# Patient Record
Sex: MALE | Race: WHITE | NOT HISPANIC OR LATINO | Employment: OTHER | URBAN - METROPOLITAN AREA
[De-identification: names, ages, dates, MRNs, and addresses within clinical notes are randomized per-mention and may not be internally consistent; named-entity substitution may affect disease eponyms.]

---

## 2017-04-17 ENCOUNTER — OFFICE VISIT (OUTPATIENT)
Dept: FAMILY MEDICINE CLINIC | Facility: CLINIC | Age: 57
End: 2017-04-17

## 2017-04-17 VITALS
OXYGEN SATURATION: 96 % | BODY MASS INDEX: 37.07 KG/M2 | HEART RATE: 60 BPM | WEIGHT: 281 LBS | TEMPERATURE: 97.7 F | SYSTOLIC BLOOD PRESSURE: 130 MMHG | DIASTOLIC BLOOD PRESSURE: 70 MMHG

## 2017-04-17 DIAGNOSIS — E78.49 OTHER HYPERLIPIDEMIA: ICD-10-CM

## 2017-04-17 DIAGNOSIS — I10 BENIGN ESSENTIAL HYPERTENSION: ICD-10-CM

## 2017-04-17 DIAGNOSIS — K50.00 CROHN'S DISEASE OF SMALL INTESTINE WITHOUT COMPLICATION (HCC): ICD-10-CM

## 2017-04-17 DIAGNOSIS — E11.9 TYPE 2 DIABETES MELLITUS WITHOUT COMPLICATION, WITHOUT LONG-TERM CURRENT USE OF INSULIN (HCC): Primary | ICD-10-CM

## 2017-04-17 DIAGNOSIS — E78.5 DYSLIPIDEMIA: ICD-10-CM

## 2017-04-17 DIAGNOSIS — E79.0 HYPERURICEMIA: ICD-10-CM

## 2017-04-17 PROCEDURE — 99214 OFFICE O/P EST MOD 30 MIN: CPT | Performed by: FAMILY MEDICINE

## 2017-04-17 RX ORDER — ONDANSETRON 4 MG/1
4 TABLET, FILM COATED ORAL EVERY 6 HOURS PRN
Qty: 30 TABLET | Refills: 1 | Status: SHIPPED | OUTPATIENT
Start: 2017-04-17 | End: 2018-05-02 | Stop reason: SDUPTHER

## 2017-04-17 RX ORDER — DICYCLOMINE HYDROCHLORIDE 10 MG/1
10 CAPSULE ORAL
Qty: 120 CAPSULE | Refills: 5 | Status: SHIPPED | OUTPATIENT
Start: 2017-04-17 | End: 2017-11-16 | Stop reason: SDUPTHER

## 2017-04-17 NOTE — PROGRESS NOTES
Subjective   Ramiro Tovar JR is a 57 y.o. male. Presents today for   Chief Complaint   Patient presents with   • Follow-up     med check refill done .  In the mornings has floaters in eyes.  Getting worse and has flashes of light in peripheal vision.     • Hyperlipidemia   • Hypertension   • Diabetes       Diabetes   He presents for his follow-up diabetic visit. He has type 2 diabetes mellitus. His disease course has been stable. Pertinent negatives for hypoglycemia include no dizziness, headaches, speech difficulty or tremors. Associated symptoms include visual change (Has up coming appt, directed to go see ophthalmology.). Pertinent negatives for diabetes include no blurred vision, no chest pain, no foot paresthesias, no foot ulcerations and no weakness. Symptoms are stable. Risk factors for coronary artery disease include diabetes mellitus, dyslipidemia, hypertension and male sex. Current diabetic treatment includes diet and oral agent (dual therapy). He is compliant with treatment all of the time. An ACE inhibitor/angiotensin II receptor blocker is being taken. Eye exam is current.   Hypertension   This is a chronic problem. The current episode started more than 1 year ago. The problem is unchanged. The problem is controlled. Pertinent negatives include no blurred vision, chest pain, headaches, orthopnea, palpitations, peripheral edema, PND or shortness of breath. There are no associated agents to hypertension. Risk factors for coronary artery disease include male gender, diabetes mellitus and dyslipidemia. Past treatments include diuretics and ACE inhibitors. The current treatment provides significant improvement. There are no compliance problems.    Hyperlipidemia   This is a chronic problem. The current episode started more than 1 year ago. The problem is controlled. Recent lipid tests were reviewed and are normal. Exacerbating diseases include diabetes. Factors aggravating his hyperlipidemia include  thiazides. Pertinent negatives include no chest pain, focal sensory loss, focal weakness, leg pain, myalgias or shortness of breath. Current antihyperlipidemic treatment includes statins. The current treatment provides moderate improvement of lipids. There are no compliance problems.  Risk factors for coronary artery disease include diabetes mellitus, dyslipidemia, hypertension and male sex.   Hx of gout, no flares  Hx of crohns - stable, only 1 flare.    Review of Systems   Constitutional: Unexpected weight change: Weight gain or loss.   Eyes: Negative for blurred vision.   Respiratory: Negative for shortness of breath and wheezing.    Cardiovascular: Negative for chest pain, palpitations, orthopnea, leg swelling and PND.   Gastrointestinal: Negative for abdominal pain, nausea and vomiting.   Musculoskeletal: Negative for myalgias.   Neurological: Negative for dizziness, tremors, focal weakness, syncope, facial asymmetry, speech difficulty, weakness, light-headedness, numbness and headaches.       The following portions of the patient's history were reviewed and updated as appropriate: allergies, current medications, past medical history and problem list.    Patient Active Problem List   Diagnosis   • Hyperuricemia   • Benign essential hypertension   • Benign prostatic hypertrophy without urinary obstruction   • Chronic pain syndrome   • Crohn's disease of colon   • Dyslipidemia   • Gout   • Weakness of hand   • Knee pain   • Low back pain   • Peripheral neuropathy   • Type 2 diabetes mellitus without complication, without long-term current use of insulin   • Vitamin D deficiency   • Crohn's disease   • History of colectomy   • Hyperlipidemia   • Calculus of kidney   • Adult body mass index greater than 30   • Small bowel obstruction       Allergies   Allergen Reactions   • Sulfa Antibiotics Rash   • Hydromorphone      Affects his chron's disease doesn't want this drug   • Hydromorphone Hcl      Affects his chron's  disease doesn't want this drug. Makes him sick and does not take away pain   • Morphine And Related      Has problems with his chron's   • Penicillins        Current Outpatient Prescriptions on File Prior to Visit   Medication Sig Dispense Refill   • allopurinol (ZYLOPRIM) 300 MG tablet Take 1 tablet by mouth 2 (Two) Times a Day. 180 tablet 3   • aspirin 81 MG tablet Take  by mouth daily.     • atorvastatin (LIPITOR) 40 MG tablet Take 1 tablet by mouth Daily. 90 tablet 3   • Cholecalciferol 1000 UNITS tablet Take  by mouth daily.     • clotrimazole-betamethasone (LOTRISONE) 1-0.05 % cream Apply  topically 2 (Two) Times a Day. 60 g 5   • colchicine 0.6 MG tablet Take 0.6 mg by mouth.     • dutasteride (AVODART) 0.5 MG capsule TAKE ONE CAPSULE BY MOUTH ONCE DAILY 90 capsule 3   • gabapentin (NEURONTIN) 600 MG tablet Take 1 tablet by mouth 3 (Three) Times a Day. 270 tablet 3   • HYDROcodone-acetaminophen (NORCO)  MG per tablet Take 1 tablet by mouth Every 6 (Six) Hours.     • lisinopril-hydrochlorothiazide (PRINZIDE,ZESTORETIC) 20-12.5 MG per tablet Take 1 tablet by mouth Daily. 90 tablet 3   • mesalamine (PENTASA) 500 MG CR capsule 2 daily 180 capsule 3   • Multiple Vitamin (MULTI VITAMIN DAILY PO) Take  by mouth daily.     • Omega-3 Fatty Acids (FISH OIL) 1000 MG capsule capsule Take 1 capsule by mouth.     • SITagliptin-MetFORMIN HCl -1000 MG tablet sustained-release 24 hour Take 1 tablet by mouth Daily. 90 tablet 3   • vitamin B-12 (CYANOCOBALAMIN) 1000 MCG tablet Take  by mouth daily.     • [DISCONTINUED] ondansetron (ZOFRAN) 4 MG tablet Take 1 tablet by mouth Every 6 (Six) Hours As Needed for nausea. 30 tablet 1     No current facility-administered medications on file prior to visit.        Objective   Vitals:    04/17/17 1030   BP: 130/70   Pulse: 60   Temp: 97.7 °F (36.5 °C)   SpO2: 96%   Weight: 281 lb (127 kg)       Physical Exam   Constitutional: He appears well-developed and well-nourished.    HENT:   Head: Normocephalic and atraumatic.   Neck: Neck supple. No JVD present. No thyromegaly present.   Cardiovascular: Normal rate, regular rhythm and normal heart sounds.  Exam reveals no gallop and no friction rub.    No murmur heard.  Pulmonary/Chest: Effort normal and breath sounds normal. No respiratory distress. He has no wheezes. He has no rales.   Abdominal: Soft. Bowel sounds are normal. He exhibits no distension. There is no tenderness. There is no rebound and no guarding.   Musculoskeletal: He exhibits no edema.   Neurological: He is alert.   Skin: Skin is warm and dry.   Psychiatric: He has a normal mood and affect. His behavior is normal.   Nursing note and vitals reviewed.      Assessment/Plan   Ramiro was seen today for follow-up, hyperlipidemia, hypertension and diabetes.    Diagnoses and all orders for this visit:    Type 2 diabetes mellitus without complication, without long-term current use of insulin  -     Comprehensive Metabolic Panel  -     Hemoglobin A1c  -     Lipid Panel  -     Uric Acid  -     Microalbumin / Creatinine Urine Ratio    Other hyperlipidemia  -     Comprehensive Metabolic Panel  -     Hemoglobin A1c  -     Lipid Panel  -     Uric Acid    Benign essential hypertension  -     Comprehensive Metabolic Panel  -     Hemoglobin A1c  -     Lipid Panel  -     Uric Acid    Hyperuricemia  -     Comprehensive Metabolic Panel  -     Hemoglobin A1c  -     Lipid Panel  -     Uric Acid    Dyslipidemia  -     Comprehensive Metabolic Panel  -     Hemoglobin A1c  -     Lipid Panel  -     Uric Acid    Crohn's disease of small intestine without complication  -     Comprehensive Metabolic Panel  -     Hemoglobin A1c  -     Lipid Panel  -     Uric Acid  -     dicyclomine (BENTYL) 10 MG capsule; Take 1 capsule by mouth 4 (Four) Times a Day Before Meals & at Bedtime.    Other orders  -     ondansetron (ZOFRAN) 4 MG tablet; Take 1 tablet by mouth Every 6 (Six) Hours As Needed for  Nausea.    -requests refills of bentyl as helps with occly cramping.  Hx of crohns, follows with GI  -hypertension - controlled, continue medications  -HLD - continue statin, recheck lipids.  -dm2 - check full labs as due       -Follow up: 6 months       Current Outpatient Prescriptions:   •  allopurinol (ZYLOPRIM) 300 MG tablet, Take 1 tablet by mouth 2 (Two) Times a Day., Disp: 180 tablet, Rfl: 3  •  aspirin 81 MG tablet, Take  by mouth daily., Disp: , Rfl:   •  atorvastatin (LIPITOR) 40 MG tablet, Take 1 tablet by mouth Daily., Disp: 90 tablet, Rfl: 3  •  Cholecalciferol 1000 UNITS tablet, Take  by mouth daily., Disp: , Rfl:   •  clotrimazole-betamethasone (LOTRISONE) 1-0.05 % cream, Apply  topically 2 (Two) Times a Day., Disp: 60 g, Rfl: 5  •  colchicine 0.6 MG tablet, Take 0.6 mg by mouth., Disp: , Rfl:   •  dutasteride (AVODART) 0.5 MG capsule, TAKE ONE CAPSULE BY MOUTH ONCE DAILY, Disp: 90 capsule, Rfl: 3  •  gabapentin (NEURONTIN) 600 MG tablet, Take 1 tablet by mouth 3 (Three) Times a Day., Disp: 270 tablet, Rfl: 3  •  HYDROcodone-acetaminophen (NORCO)  MG per tablet, Take 1 tablet by mouth Every 6 (Six) Hours., Disp: , Rfl:   •  lisinopril-hydrochlorothiazide (PRINZIDE,ZESTORETIC) 20-12.5 MG per tablet, Take 1 tablet by mouth Daily., Disp: 90 tablet, Rfl: 3  •  mesalamine (PENTASA) 500 MG CR capsule, 2 daily, Disp: 180 capsule, Rfl: 3  •  Multiple Vitamin (MULTI VITAMIN DAILY PO), Take  by mouth daily., Disp: , Rfl:   •  Omega-3 Fatty Acids (FISH OIL) 1000 MG capsule capsule, Take 1 capsule by mouth., Disp: , Rfl:   •  ondansetron (ZOFRAN) 4 MG tablet, Take 1 tablet by mouth Every 6 (Six) Hours As Needed for Nausea., Disp: 30 tablet, Rfl: 1  •  SITagliptin-MetFORMIN HCl -1000 MG tablet sustained-release 24 hour, Take 1 tablet by mouth Daily., Disp: 90 tablet, Rfl: 3  •  vitamin B-12 (CYANOCOBALAMIN) 1000 MCG tablet, Take  by mouth daily., Disp: , Rfl:

## 2017-04-18 LAB
ALBUMIN SERPL-MCNC: 4.3 G/DL (ref 3.5–5.5)
ALBUMIN/CREAT UR: 11.7 MG/G CREAT (ref 0–30)
ALBUMIN/GLOB SERPL: 2 {RATIO} (ref 1.2–2.2)
ALP SERPL-CCNC: 58 IU/L (ref 39–117)
ALT SERPL-CCNC: 24 IU/L (ref 0–44)
AST SERPL-CCNC: 23 IU/L (ref 0–40)
BILIRUB SERPL-MCNC: 0.4 MG/DL (ref 0–1.2)
BUN SERPL-MCNC: 16 MG/DL (ref 6–24)
BUN/CREAT SERPL: 18 (ref 9–20)
CALCIUM SERPL-MCNC: 9.5 MG/DL (ref 8.7–10.2)
CHLORIDE SERPL-SCNC: 103 MMOL/L (ref 96–106)
CHOLEST SERPL-MCNC: 104 MG/DL (ref 100–199)
CO2 SERPL-SCNC: 24 MMOL/L (ref 18–29)
CREAT SERPL-MCNC: 0.91 MG/DL (ref 0.76–1.27)
CREAT UR-MCNC: 137.8 MG/DL
GLOBULIN SER CALC-MCNC: 2.2 G/DL (ref 1.5–4.5)
GLUCOSE SERPL-MCNC: 120 MG/DL (ref 65–99)
HBA1C MFR BLD: 6.5 % (ref 4.8–5.6)
HDLC SERPL-MCNC: 27 MG/DL
LDLC SERPL CALC-MCNC: 50 MG/DL (ref 0–99)
MICROALBUMIN UR-MCNC: 16.1 UG/ML
POTASSIUM SERPL-SCNC: 4.6 MMOL/L (ref 3.5–5.2)
PROT SERPL-MCNC: 6.5 G/DL (ref 6–8.5)
SODIUM SERPL-SCNC: 142 MMOL/L (ref 134–144)
TRIGL SERPL-MCNC: 134 MG/DL (ref 0–149)
URATE SERPL-MCNC: 6 MG/DL (ref 3.7–8.6)
VLDLC SERPL CALC-MCNC: 27 MG/DL (ref 5–40)

## 2017-06-12 RX ORDER — LISINOPRIL AND HYDROCHLOROTHIAZIDE 20; 12.5 MG/1; MG/1
TABLET ORAL
Qty: 90 TABLET | Refills: 1 | Status: SHIPPED | OUTPATIENT
Start: 2017-06-12 | End: 2017-11-16 | Stop reason: SDUPTHER

## 2017-09-25 ENCOUNTER — OFFICE VISIT (OUTPATIENT)
Dept: FAMILY MEDICINE CLINIC | Facility: CLINIC | Age: 57
End: 2017-09-25

## 2017-09-25 VITALS
OXYGEN SATURATION: 96 % | DIASTOLIC BLOOD PRESSURE: 72 MMHG | BODY MASS INDEX: 38 KG/M2 | HEART RATE: 64 BPM | TEMPERATURE: 98.1 F | SYSTOLIC BLOOD PRESSURE: 138 MMHG | WEIGHT: 288 LBS

## 2017-09-25 DIAGNOSIS — Z23 IMMUNIZATION DUE: ICD-10-CM

## 2017-09-25 DIAGNOSIS — L72.3 INFECTED SEBACEOUS CYST: Primary | ICD-10-CM

## 2017-09-25 DIAGNOSIS — L08.9 INFECTED SEBACEOUS CYST: Primary | ICD-10-CM

## 2017-09-25 PROCEDURE — 90686 IIV4 VACC NO PRSV 0.5 ML IM: CPT | Performed by: FAMILY MEDICINE

## 2017-09-25 PROCEDURE — 90471 IMMUNIZATION ADMIN: CPT | Performed by: FAMILY MEDICINE

## 2017-09-25 PROCEDURE — 99213 OFFICE O/P EST LOW 20 MIN: CPT | Performed by: FAMILY MEDICINE

## 2017-09-25 RX ORDER — CLINDAMYCIN HYDROCHLORIDE 150 MG/1
150 CAPSULE ORAL 3 TIMES DAILY
Qty: 30 CAPSULE | Refills: 0 | Status: SHIPPED | OUTPATIENT
Start: 2017-09-25 | End: 2017-11-16

## 2017-09-25 NOTE — PROGRESS NOTES
Subjective   Ramiro Tovar JR is a 57 y.o. male. Presents today for   Chief Complaint   Patient presents with   • Follow-up     went to [facility] last week for cyst removal in back and needs checked. Flu vaccine       Cyst   This is a new problem. The current episode started in the past 7 days. The problem occurs constantly. The problem has been gradually improving. Pertinent negatives include no chills or fever. Associated symptoms comments: Has sebaceous cysts 1 neck and 1 mid-back;  1 mid-back became red, enlarged and painful.  Went to IC with I&D done.  Placed on doxy, but not tolerating as severe nausea.. Nothing aggravates the symptoms. Treatments tried: I&D, doxy. The treatment provided mild relief.     Reports ED, would like try viagra.    Due for flu  Review of Systems   Constitutional: Negative for chills and fever.   Skin: Positive for wound.       The following portions of the patient's history were reviewed and updated as appropriate: allergies, current medications, past medical history, past surgical history and problem list.    Patient Active Problem List   Diagnosis   • Hyperuricemia   • Benign essential hypertension   • Benign prostatic hypertrophy without urinary obstruction   • Chronic pain syndrome   • Crohn's disease of colon   • Dyslipidemia   • Gout   • Weakness of hand   • Knee pain   • Low back pain   • Peripheral neuropathy   • Type 2 diabetes mellitus without complication, without long-term current use of insulin   • Vitamin D deficiency   • Crohn's disease   • History of colectomy   • Hyperlipidemia   • Calculus of kidney   • Adult body mass index greater than 30   • Small bowel obstruction       Allergies   Allergen Reactions   • Sulfa Antibiotics Rash   • Hydromorphone      Affects his chron's disease doesn't want this drug   • Hydromorphone Hcl      Affects his chron's disease doesn't want this drug. Makes him sick and does not take away pain   • Morphine And Related      Has problems with  his chron's   • Penicillins        Current Outpatient Prescriptions on File Prior to Visit   Medication Sig Dispense Refill   • allopurinol (ZYLOPRIM) 300 MG tablet Take 1 tablet by mouth 2 (Two) Times a Day. 180 tablet 3   • aspirin 81 MG tablet Take  by mouth daily.     • atorvastatin (LIPITOR) 40 MG tablet Take 1 tablet by mouth Daily. 90 tablet 3   • Cholecalciferol 1000 UNITS tablet Take  by mouth daily.     • clotrimazole-betamethasone (LOTRISONE) 1-0.05 % cream Apply  topically 2 (Two) Times a Day. 60 g 5   • colchicine 0.6 MG tablet Take 0.6 mg by mouth.     • dicyclomine (BENTYL) 10 MG capsule Take 1 capsule by mouth 4 (Four) Times a Day Before Meals & at Bedtime. 120 capsule 5   • dutasteride (AVODART) 0.5 MG capsule TAKE ONE CAPSULE BY MOUTH ONCE DAILY 90 capsule 3   • gabapentin (NEURONTIN) 600 MG tablet Take 1 tablet by mouth 3 (Three) Times a Day. 270 tablet 3   • HYDROcodone-acetaminophen (NORCO)  MG per tablet Take 1 tablet by mouth Every 6 (Six) Hours.     • lisinopril-hydrochlorothiazide (PRINZIDE,ZESTORETIC) 20-12.5 MG per tablet Take 1 tablet by mouth Daily. 90 tablet 3   • lisinopril-hydrochlorothiazide (PRINZIDE,ZESTORETIC) 20-12.5 MG per tablet TAKE ONE TABLET BY MOUTH ONCE DAILY 90 tablet 1   • mesalamine (PENTASA) 500 MG CR capsule 2 daily 180 capsule 3   • Multiple Vitamin (MULTI VITAMIN DAILY PO) Take  by mouth daily.     • Omega-3 Fatty Acids (FISH OIL) 1000 MG capsule capsule Take 1 capsule by mouth.     • ondansetron (ZOFRAN) 4 MG tablet Take 1 tablet by mouth Every 6 (Six) Hours As Needed for Nausea. 30 tablet 1   • SITagliptin-MetFORMIN HCl -1000 MG tablet sustained-release 24 hour Take 1 tablet by mouth Daily. 90 tablet 3   • vitamin B-12 (CYANOCOBALAMIN) 1000 MCG tablet Take  by mouth daily.       No current facility-administered medications on file prior to visit.        Objective   Vitals:    09/25/17 0925   BP: 138/72   Pulse: 64   Temp: 98.1 °F (36.7 °C)   SpO2: 96%    Weight: 288 lb (131 kg)       Physical Exam   Constitutional: He appears well-developed and well-nourished.   Neck: Neck supple.   Neurological: He is alert.   Skin: Skin is warm and dry.        Psychiatric: He has a normal mood and affect. His behavior is normal.   Nursing note and vitals reviewed.      Assessment/Plan   Ramiro was seen today for follow-up.    Diagnoses and all orders for this visit:    Infected sebaceous cyst  -     mupirocin (BACTROBAN) 2 % ointment; Apply  topically 2 (Two) Times a Day.  -     clindamycin (CLEOCIN) 150 MG capsule; Take 1 capsule by mouth 3 (Three) Times a Day.    Immunization due  -     Flu Vaccine Quad PF 3YR+    -stop doxy;  Will change abx;  Scanty drainage, will keep packing out.  Keep covered while draining;  ONce heals, recommend removal;  Leaves for [place] after his November appt and would like wait until Spring and will discuss then  -gave viagra #3 50mg samples, use as directed;  D/w side effects and never use with nitro if ever takes.         -Follow up: 2 months       Current Outpatient Prescriptions:   •  allopurinol (ZYLOPRIM) 300 MG tablet, Take 1 tablet by mouth 2 (Two) Times a Day., Disp: 180 tablet, Rfl: 3  •  aspirin 81 MG tablet, Take  by mouth daily., Disp: , Rfl:   •  atorvastatin (LIPITOR) 40 MG tablet, Take 1 tablet by mouth Daily., Disp: 90 tablet, Rfl: 3  •  Cholecalciferol 1000 UNITS tablet, Take  by mouth daily., Disp: , Rfl:   •  clotrimazole-betamethasone (LOTRISONE) 1-0.05 % cream, Apply  topically 2 (Two) Times a Day., Disp: 60 g, Rfl: 5  •  colchicine 0.6 MG tablet, Take 0.6 mg by mouth., Disp: , Rfl:   •  dicyclomine (BENTYL) 10 MG capsule, Take 1 capsule by mouth 4 (Four) Times a Day Before Meals & at Bedtime., Disp: 120 capsule, Rfl: 5  •  dutasteride (AVODART) 0.5 MG capsule, TAKE ONE CAPSULE BY MOUTH ONCE DAILY, Disp: 90 capsule, Rfl: 3  •  gabapentin (NEURONTIN) 600 MG tablet, Take 1 tablet by mouth 3 (Three) Times a Day., Disp: 270  tablet, Rfl: 3  •  HYDROcodone-acetaminophen (NORCO)  MG per tablet, Take 1 tablet by mouth Every 6 (Six) Hours., Disp: , Rfl:   •  lisinopril-hydrochlorothiazide (PRINZIDE,ZESTORETIC) 20-12.5 MG per tablet, Take 1 tablet by mouth Daily., Disp: 90 tablet, Rfl: 3  •  lisinopril-hydrochlorothiazide (PRINZIDE,ZESTORETIC) 20-12.5 MG per tablet, TAKE ONE TABLET BY MOUTH ONCE DAILY, Disp: 90 tablet, Rfl: 1  •  mesalamine (PENTASA) 500 MG CR capsule, 2 daily, Disp: 180 capsule, Rfl: 3  •  Multiple Vitamin (MULTI VITAMIN DAILY PO), Take  by mouth daily., Disp: , Rfl:   •  Omega-3 Fatty Acids (FISH OIL) 1000 MG capsule capsule, Take 1 capsule by mouth., Disp: , Rfl:   •  ondansetron (ZOFRAN) 4 MG tablet, Take 1 tablet by mouth Every 6 (Six) Hours As Needed for Nausea., Disp: 30 tablet, Rfl: 1  •  SITagliptin-MetFORMIN HCl -1000 MG tablet sustained-release 24 hour, Take 1 tablet by mouth Daily., Disp: 90 tablet, Rfl: 3  •  vitamin B-12 (CYANOCOBALAMIN) 1000 MCG tablet, Take  by mouth daily., Disp: , Rfl:   •  clindamycin (CLEOCIN) 150 MG capsule, Take 1 capsule by mouth 3 (Three) Times a Day., Disp: 30 capsule, Rfl: 0  •  mupirocin (BACTROBAN) 2 % ointment, Apply  topically 2 (Two) Times a Day., Disp: 22 g, Rfl: 0

## 2017-11-16 ENCOUNTER — OFFICE VISIT (OUTPATIENT)
Dept: FAMILY MEDICINE CLINIC | Facility: CLINIC | Age: 57
End: 2017-11-16

## 2017-11-16 VITALS
BODY MASS INDEX: 38.26 KG/M2 | HEART RATE: 74 BPM | TEMPERATURE: 98.2 F | DIASTOLIC BLOOD PRESSURE: 80 MMHG | WEIGHT: 290 LBS | SYSTOLIC BLOOD PRESSURE: 132 MMHG | OXYGEN SATURATION: 95 %

## 2017-11-16 DIAGNOSIS — E79.0 HYPERURICEMIA: ICD-10-CM

## 2017-11-16 DIAGNOSIS — K50.00 CROHN'S DISEASE OF SMALL INTESTINE WITHOUT COMPLICATION (HCC): ICD-10-CM

## 2017-11-16 DIAGNOSIS — I10 BENIGN ESSENTIAL HYPERTENSION: ICD-10-CM

## 2017-11-16 DIAGNOSIS — E78.5 DYSLIPIDEMIA: ICD-10-CM

## 2017-11-16 DIAGNOSIS — E11.9 TYPE 2 DIABETES MELLITUS WITHOUT COMPLICATION, WITHOUT LONG-TERM CURRENT USE OF INSULIN (HCC): Primary | ICD-10-CM

## 2017-11-16 DIAGNOSIS — E55.9 VITAMIN D DEFICIENCY: ICD-10-CM

## 2017-11-16 LAB
25(OH)D3+25(OH)D2 SERPL-MCNC: 23.5 NG/ML (ref 30–100)
ALBUMIN SERPL-MCNC: 4.4 G/DL (ref 3.5–5.2)
ALBUMIN/GLOB SERPL: 1.6 G/DL
ALP SERPL-CCNC: 69 U/L (ref 39–117)
ALT SERPL-CCNC: 43 U/L (ref 1–41)
AST SERPL-CCNC: 35 U/L (ref 1–40)
BILIRUB SERPL-MCNC: 0.4 MG/DL (ref 0.1–1.2)
BUN SERPL-MCNC: 21 MG/DL (ref 6–20)
BUN/CREAT SERPL: 17.6 (ref 7–25)
CALCIUM SERPL-MCNC: 10.3 MG/DL (ref 8.6–10.5)
CHLORIDE SERPL-SCNC: 101 MMOL/L (ref 98–107)
CHOLEST SERPL-MCNC: 129 MG/DL (ref 0–200)
CO2 SERPL-SCNC: 25.4 MMOL/L (ref 22–29)
CREAT SERPL-MCNC: 1.19 MG/DL (ref 0.76–1.27)
GFR SERPLBLD CREATININE-BSD FMLA CKD-EPI: 63 ML/MIN/1.73
GFR SERPLBLD CREATININE-BSD FMLA CKD-EPI: 76 ML/MIN/1.73
GLOBULIN SER CALC-MCNC: 2.8 GM/DL
GLUCOSE SERPL-MCNC: 131 MG/DL (ref 65–99)
HBA1C MFR BLD: 6.36 % (ref 4.8–5.6)
HDLC SERPL-MCNC: 27 MG/DL (ref 40–60)
LDLC SERPL CALC-MCNC: 54 MG/DL (ref 0–100)
POTASSIUM SERPL-SCNC: 4.5 MMOL/L (ref 3.5–5.2)
PROT SERPL-MCNC: 7.2 G/DL (ref 6–8.5)
SODIUM SERPL-SCNC: 142 MMOL/L (ref 136–145)
TRIGL SERPL-MCNC: 238 MG/DL (ref 0–150)
URATE SERPL-MCNC: 5.9 MG/DL (ref 3.4–7)
VLDLC SERPL CALC-MCNC: 47.6 MG/DL (ref 5–40)

## 2017-11-16 PROCEDURE — 99214 OFFICE O/P EST MOD 30 MIN: CPT | Performed by: FAMILY MEDICINE

## 2017-11-16 RX ORDER — ALLOPURINOL 300 MG/1
300 TABLET ORAL 2 TIMES DAILY
Qty: 180 TABLET | Refills: 3 | Status: SHIPPED | OUTPATIENT
Start: 2017-11-16 | End: 2018-05-14 | Stop reason: SDUPTHER

## 2017-11-16 RX ORDER — COLCHICINE 0.6 MG/1
0.6 TABLET ORAL DAILY
Qty: 30 TABLET | Refills: 6 | Status: SHIPPED | OUTPATIENT
Start: 2017-11-16

## 2017-11-16 RX ORDER — LISINOPRIL AND HYDROCHLOROTHIAZIDE 20; 12.5 MG/1; MG/1
1 TABLET ORAL DAILY
Qty: 90 TABLET | Refills: 3 | Status: SHIPPED | OUTPATIENT
Start: 2017-11-16 | End: 2018-05-14 | Stop reason: SDUPTHER

## 2017-11-16 RX ORDER — DICYCLOMINE HYDROCHLORIDE 10 MG/1
10 CAPSULE ORAL
Qty: 120 CAPSULE | Refills: 5 | Status: SHIPPED | OUTPATIENT
Start: 2017-11-16 | End: 2018-05-14 | Stop reason: SDUPTHER

## 2017-11-16 RX ORDER — ATORVASTATIN CALCIUM 40 MG/1
40 TABLET, FILM COATED ORAL DAILY
Qty: 90 TABLET | Refills: 3 | Status: SHIPPED | OUTPATIENT
Start: 2017-11-16 | End: 2018-05-14 | Stop reason: SDUPTHER

## 2017-11-16 RX ORDER — MESALAMINE 500 MG/1
CAPSULE, EXTENDED RELEASE ORAL
Qty: 180 CAPSULE | Refills: 3 | Status: SHIPPED | OUTPATIENT
Start: 2017-11-16 | End: 2018-05-14 | Stop reason: SDUPTHER

## 2017-11-16 RX ORDER — DUTASTERIDE 0.5 MG/1
0.5 CAPSULE, LIQUID FILLED ORAL DAILY
Qty: 90 CAPSULE | Refills: 3 | Status: SHIPPED | OUTPATIENT
Start: 2017-11-16 | End: 2018-05-14 | Stop reason: SDUPTHER

## 2017-11-16 NOTE — PROGRESS NOTES
Subjective   Ramiro Tovar JR is a 57 y.o. male. Presents today for   Chief Complaint   Patient presents with   • Follow-up     med check refills diabetic foot exam   • Hypertension   • Hyperlipidemia   • Diabetes   • Gout   • Crohn's Disease       Diabetes   He presents for his follow-up diabetic visit. He has type 2 diabetes mellitus. His disease course has been stable. Pertinent negatives for hypoglycemia include no dizziness. Pertinent negatives for diabetes include no chest pain, no foot paresthesias and no foot ulcerations. Symptoms are stable. Pertinent negatives for diabetic complications include no CVA. Risk factors for coronary artery disease include dyslipidemia, diabetes mellitus and hypertension. His weight is stable. He is following a diabetic diet. An ACE inhibitor/angiotensin II receptor blocker is being taken.   Hypertension   This is a chronic problem. The current episode started more than 1 year ago. The problem is unchanged. The problem is controlled. Pertinent negatives include no chest pain, orthopnea, palpitations, peripheral edema, PND or shortness of breath. There are no associated agents to hypertension. Risk factors for coronary artery disease include dyslipidemia, diabetes mellitus and male gender. Past treatments include ACE inhibitors and diuretics. The current treatment provides moderate improvement. There are no compliance problems.  There is no history of kidney disease, CAD/MI, CVA or heart failure. There is no history of chronic renal disease.   Hyperlipidemia   This is a chronic problem. The current episode started more than 1 year ago. The problem is controlled. Recent lipid tests were reviewed and are normal. Exacerbating diseases include diabetes. He has no history of chronic renal disease. Factors aggravating his hyperlipidemia include thiazides. Pertinent negatives include no chest pain, focal sensory loss, focal weakness or shortness of breath. Current antihyperlipidemic  treatment includes statins. The current treatment provides moderate improvement of lipids. There are no compliance problems.  Risk factors for coronary artery disease include dyslipidemia, diabetes mellitus, hypertension and male sex.   Crohns  Patient follows with GI;   Has off/on cramping;  No black or bloody stools;   Uses pain medication occly.  No n/v now, had some prior;  Has next appt March 2018 with GI  Hyperuricemia  No gout attacks;  On allopurinol and no gout attacks since then.    Review of Systems   Respiratory: Negative for shortness of breath.    Cardiovascular: Negative for chest pain, palpitations, orthopnea, leg swelling and PND.   Gastrointestinal: Positive for nausea. Negative for abdominal pain, blood in stool and vomiting.   Musculoskeletal: Negative for arthralgias.   Neurological: Negative for dizziness, focal weakness and syncope.       The following portions of the patient's history were reviewed and updated as appropriate: allergies, current medications, past medical history and problem list.    Patient Active Problem List   Diagnosis   • Hyperuricemia   • Benign essential hypertension   • Benign prostatic hypertrophy without urinary obstruction   • Chronic pain syndrome   • Crohn's disease of colon   • Dyslipidemia   • Gout   • Weakness of hand   • Knee pain   • Low back pain   • Peripheral neuropathy   • Type 2 diabetes mellitus without complication, without long-term current use of insulin   • Vitamin D deficiency   • Crohn's disease   • History of colectomy   • Hyperlipidemia   • Calculus of kidney   • Adult body mass index greater than 30   • SBO (small bowel obstruction)       Allergies   Allergen Reactions   • Sulfa Antibiotics Rash   • Hydromorphone      Affects his chron's disease doesn't want this drug   • Hydromorphone Hcl      Affects his chron's disease doesn't want this drug. Makes him sick and does not take away pain   • Morphine And Related      Has problems with his  chron's   • Penicillins        Current Outpatient Prescriptions on File Prior to Visit   Medication Sig Dispense Refill   • aspirin 81 MG tablet Take  by mouth daily.     • Cholecalciferol 1000 UNITS tablet Take  by mouth daily.     • clotrimazole-betamethasone (LOTRISONE) 1-0.05 % cream Apply  topically 2 (Two) Times a Day. 60 g 5   • gabapentin (NEURONTIN) 600 MG tablet Take 1 tablet by mouth 3 (Three) Times a Day. 270 tablet 3   • HYDROcodone-acetaminophen (NORCO)  MG per tablet Take 1 tablet by mouth Every 6 (Six) Hours.     • Multiple Vitamin (MULTI VITAMIN DAILY PO) Take  by mouth daily.     • mupirocin (BACTROBAN) 2 % ointment Apply  topically 2 (Two) Times a Day. 22 g 0   • Omega-3 Fatty Acids (FISH OIL) 1000 MG capsule capsule Take 1 capsule by mouth.     • ondansetron (ZOFRAN) 4 MG tablet Take 1 tablet by mouth Every 6 (Six) Hours As Needed for Nausea. 30 tablet 1   • vitamin B-12 (CYANOCOBALAMIN) 1000 MCG tablet Take  by mouth daily.     • [DISCONTINUED] allopurinol (ZYLOPRIM) 300 MG tablet Take 1 tablet by mouth 2 (Two) Times a Day. 180 tablet 3   • [DISCONTINUED] atorvastatin (LIPITOR) 40 MG tablet Take 1 tablet by mouth Daily. 90 tablet 3   • [DISCONTINUED] colchicine 0.6 MG tablet Take 0.6 mg by mouth.     • [DISCONTINUED] dicyclomine (BENTYL) 10 MG capsule Take 1 capsule by mouth 4 (Four) Times a Day Before Meals & at Bedtime. 120 capsule 5   • [DISCONTINUED] dutasteride (AVODART) 0.5 MG capsule TAKE ONE CAPSULE BY MOUTH ONCE DAILY 90 capsule 3   • [DISCONTINUED] lisinopril-hydrochlorothiazide (PRINZIDE,ZESTORETIC) 20-12.5 MG per tablet Take 1 tablet by mouth Daily. 90 tablet 3   • [DISCONTINUED] mesalamine (PENTASA) 500 MG CR capsule 2 daily 180 capsule 3   • [DISCONTINUED] SITagliptin-MetFORMIN HCl -1000 MG tablet sustained-release 24 hour Take 1 tablet by mouth Daily. 90 tablet 3   • [DISCONTINUED] clindamycin (CLEOCIN) 150 MG capsule Take 1 capsule by mouth 3 (Three) Times a Day. 30  capsule 0   • [DISCONTINUED] lisinopril-hydrochlorothiazide (PRINZIDE,ZESTORETIC) 20-12.5 MG per tablet TAKE ONE TABLET BY MOUTH ONCE DAILY 90 tablet 1     No current facility-administered medications on file prior to visit.        Objective   Vitals:    11/16/17 0822   BP: 132/80   Pulse: 74   Temp: 98.2 °F (36.8 °C)   SpO2: 95%   Weight: 290 lb (132 kg)       Physical Exam   Constitutional: He appears well-developed and well-nourished.   HENT:   Head: Normocephalic and atraumatic.   Neck: Neck supple. No JVD present. No thyromegaly present.   Cardiovascular: Normal rate, regular rhythm and normal heart sounds.  Exam reveals no gallop and no friction rub.    No murmur heard.  Pulmonary/Chest: Effort normal and breath sounds normal. No respiratory distress. He has no wheezes. He has no rales.   Abdominal: Soft. Bowel sounds are normal. He exhibits no distension. There is no tenderness. There is no rebound and no guarding.   Musculoskeletal: He exhibits no edema.    Ramiro had a diabetic foot exam performed (see scanned report) today.   During the foot exam he had a monofilament test performed.  Neurological: He is alert.   Skin: Skin is warm and dry.   Psychiatric: He has a normal mood and affect. His behavior is normal.   Nursing note and vitals reviewed.      Assessment/Plan   Ramiro was seen today for follow-up, hypertension, hyperlipidemia, diabetes, gout and crohn's disease.    Diagnoses and all orders for this visit:    Type 2 diabetes mellitus without complication, without long-term current use of insulin  -     Comprehensive Metabolic Panel  -     Lipid Panel  -     Hemoglobin A1c  -     Uric Acid  -     Vitamin D 25 Hydroxy    Crohn's disease of small intestine without complication  -     dicyclomine (BENTYL) 10 MG capsule; Take 1 capsule by mouth 4 (Four) Times a Day Before Meals & at Bedtime.  -     Comprehensive Metabolic Panel  -     Lipid Panel  -     Hemoglobin A1c  -     Uric Acid  -     Vitamin D  25 Hydroxy    Vitamin D deficiency  -     Comprehensive Metabolic Panel  -     Lipid Panel  -     Hemoglobin A1c  -     Uric Acid  -     Vitamin D 25 Hydroxy    Hyperuricemia  -     Comprehensive Metabolic Panel  -     Lipid Panel  -     Hemoglobin A1c  -     Uric Acid  -     Vitamin D 25 Hydroxy    Dyslipidemia  -     Comprehensive Metabolic Panel  -     Lipid Panel  -     Hemoglobin A1c  -     Uric Acid  -     Vitamin D 25 Hydroxy    Benign essential hypertension  -     Comprehensive Metabolic Panel  -     Lipid Panel  -     Hemoglobin A1c  -     Uric Acid  -     Vitamin D 25 Hydroxy    Other orders  -     allopurinol (ZYLOPRIM) 300 MG tablet; Take 1 tablet by mouth 2 (Two) Times a Day.  -     atorvastatin (LIPITOR) 40 MG tablet; Take 1 tablet by mouth Daily.  -     colchicine 0.6 MG tablet; Take 1 tablet by mouth Daily.  -     dutasteride (AVODART) 0.5 MG capsule; Take 1 capsule by mouth Daily.  -     lisinopril-hydrochlorothiazide (PRINZIDE,ZESTORETIC) 20-12.5 MG per tablet; Take 1 tablet by mouth Daily.  -     mesalamine (PENTASA) 500 MG CR capsule; 2 daily  -     SITagliptin-MetFORMIN HCl -1000 MG tablet sustained-release 24 hour; Take 1 tablet by mouth Daily.    -f/u with Gi for crohns  -dm2 - recheck labs  -hypertension - controlled, continue medications  -HLD - continue statin, recheck lipids.  -no gout flares, recheck uric aicd         -Follow up: 6 months       Current Outpatient Prescriptions:   •  allopurinol (ZYLOPRIM) 300 MG tablet, Take 1 tablet by mouth 2 (Two) Times a Day., Disp: 180 tablet, Rfl: 3  •  aspirin 81 MG tablet, Take  by mouth daily., Disp: , Rfl:   •  atorvastatin (LIPITOR) 40 MG tablet, Take 1 tablet by mouth Daily., Disp: 90 tablet, Rfl: 3  •  Cholecalciferol 1000 UNITS tablet, Take  by mouth daily., Disp: , Rfl:   •  clotrimazole-betamethasone (LOTRISONE) 1-0.05 % cream, Apply  topically 2 (Two) Times a Day., Disp: 60 g, Rfl: 5  •  colchicine 0.6 MG tablet, Take 1 tablet by  mouth Daily., Disp: 30 tablet, Rfl: 6  •  dicyclomine (BENTYL) 10 MG capsule, Take 1 capsule by mouth 4 (Four) Times a Day Before Meals & at Bedtime., Disp: 120 capsule, Rfl: 5  •  dutasteride (AVODART) 0.5 MG capsule, Take 1 capsule by mouth Daily., Disp: 90 capsule, Rfl: 3  •  gabapentin (NEURONTIN) 600 MG tablet, Take 1 tablet by mouth 3 (Three) Times a Day., Disp: 270 tablet, Rfl: 3  •  HYDROcodone-acetaminophen (NORCO)  MG per tablet, Take 1 tablet by mouth Every 6 (Six) Hours., Disp: , Rfl:   •  lisinopril-hydrochlorothiazide (PRINZIDE,ZESTORETIC) 20-12.5 MG per tablet, Take 1 tablet by mouth Daily., Disp: 90 tablet, Rfl: 3  •  mesalamine (PENTASA) 500 MG CR capsule, 2 daily, Disp: 180 capsule, Rfl: 3  •  Multiple Vitamin (MULTI VITAMIN DAILY PO), Take  by mouth daily., Disp: , Rfl:   •  mupirocin (BACTROBAN) 2 % ointment, Apply  topically 2 (Two) Times a Day., Disp: 22 g, Rfl: 0  •  Omega-3 Fatty Acids (FISH OIL) 1000 MG capsule capsule, Take 1 capsule by mouth., Disp: , Rfl:   •  ondansetron (ZOFRAN) 4 MG tablet, Take 1 tablet by mouth Every 6 (Six) Hours As Needed for Nausea., Disp: 30 tablet, Rfl: 1  •  SITagliptin-MetFORMIN HCl -1000 MG tablet sustained-release 24 hour, Take 1 tablet by mouth Daily., Disp: 90 tablet, Rfl: 3  •  vitamin B-12 (CYANOCOBALAMIN) 1000 MCG tablet, Take  by mouth daily., Disp: , Rfl:

## 2017-11-19 NOTE — PROGRESS NOTES
Diabetes is adequately controlled.  Cholesterol is adequately controlled.  Triglycerides mildly elevated, work on weight loss  Vitamin D low, OTC vitamin D 2000 iu po daily if not already taking  Rest of labs normal or stable.

## 2018-05-02 RX ORDER — ONDANSETRON 4 MG/1
TABLET, FILM COATED ORAL
Qty: 30 TABLET | Refills: 1 | Status: SHIPPED | OUTPATIENT
Start: 2018-05-02 | End: 2018-05-14 | Stop reason: SDUPTHER

## 2018-05-14 ENCOUNTER — OFFICE VISIT (OUTPATIENT)
Dept: FAMILY MEDICINE CLINIC | Facility: CLINIC | Age: 58
End: 2018-05-14

## 2018-05-14 VITALS
TEMPERATURE: 97.6 F | WEIGHT: 274 LBS | DIASTOLIC BLOOD PRESSURE: 74 MMHG | HEART RATE: 56 BPM | OXYGEN SATURATION: 96 % | SYSTOLIC BLOOD PRESSURE: 132 MMHG | BODY MASS INDEX: 36.15 KG/M2

## 2018-05-14 DIAGNOSIS — E78.49 OTHER HYPERLIPIDEMIA: ICD-10-CM

## 2018-05-14 DIAGNOSIS — K50.00 CROHN'S DISEASE OF SMALL INTESTINE WITHOUT COMPLICATION (HCC): ICD-10-CM

## 2018-05-14 DIAGNOSIS — E55.9 VITAMIN D DEFICIENCY: ICD-10-CM

## 2018-05-14 DIAGNOSIS — K50.10 CROHN'S DISEASE OF COLON WITHOUT COMPLICATION (HCC): ICD-10-CM

## 2018-05-14 DIAGNOSIS — E53.8 B12 DEFICIENCY: ICD-10-CM

## 2018-05-14 DIAGNOSIS — Z00.00 WELLNESS EXAMINATION: Primary | ICD-10-CM

## 2018-05-14 DIAGNOSIS — Z90.49 HISTORY OF COLECTOMY: ICD-10-CM

## 2018-05-14 DIAGNOSIS — I10 BENIGN ESSENTIAL HYPERTENSION: ICD-10-CM

## 2018-05-14 DIAGNOSIS — E11.9 TYPE 2 DIABETES MELLITUS WITHOUT COMPLICATION, WITHOUT LONG-TERM CURRENT USE OF INSULIN (HCC): ICD-10-CM

## 2018-05-14 LAB
HCT VFR BLDA CALC: 44.3 %
HGB BLDA-MCNC: 14.2 G/DL
MCH, POC: 28.9
MCHC, POC: 32
MCV, POC: 90.3
PLATELET # BLD AUTO: 168 10*3/MM3
PMV BLD: 9.1 FL
POC CREATININE URINE: 200
POC MICROALBUMIN URINE: 30
RBC, POC: 4.91
RDW, POC: 15.2
WBC # BLD: 8.6 10*3/UL

## 2018-05-14 PROCEDURE — 85027 COMPLETE CBC AUTOMATED: CPT | Performed by: FAMILY MEDICINE

## 2018-05-14 PROCEDURE — 82044 UR ALBUMIN SEMIQUANTITATIVE: CPT | Performed by: FAMILY MEDICINE

## 2018-05-14 PROCEDURE — 99396 PREV VISIT EST AGE 40-64: CPT | Performed by: FAMILY MEDICINE

## 2018-05-14 RX ORDER — ALLOPURINOL 300 MG/1
300 TABLET ORAL DAILY
Qty: 90 TABLET | Refills: 3 | Status: SHIPPED | OUTPATIENT
Start: 2018-05-14 | End: 2020-07-06 | Stop reason: SDUPTHER

## 2018-05-14 RX ORDER — DICYCLOMINE HYDROCHLORIDE 10 MG/1
10 CAPSULE ORAL
Qty: 120 CAPSULE | Refills: 5 | Status: SHIPPED | OUTPATIENT
Start: 2018-05-14 | End: 2020-02-05 | Stop reason: DRUGHIGH

## 2018-05-14 RX ORDER — ONDANSETRON 4 MG/1
4 TABLET, FILM COATED ORAL EVERY 6 HOURS PRN
Qty: 30 TABLET | Refills: 1 | Status: SHIPPED | OUTPATIENT
Start: 2018-05-14 | End: 2018-11-13 | Stop reason: SDUPTHER

## 2018-05-14 RX ORDER — MESALAMINE 500 MG/1
CAPSULE, EXTENDED RELEASE ORAL
Qty: 180 CAPSULE | Refills: 3 | Status: SHIPPED | OUTPATIENT
Start: 2018-05-14 | End: 2019-05-18 | Stop reason: SDUPTHER

## 2018-05-14 RX ORDER — LISINOPRIL AND HYDROCHLOROTHIAZIDE 20; 12.5 MG/1; MG/1
1 TABLET ORAL DAILY
Qty: 90 TABLET | Refills: 3 | Status: SHIPPED | OUTPATIENT
Start: 2018-05-14 | End: 2019-05-18 | Stop reason: SDUPTHER

## 2018-05-14 RX ORDER — ATORVASTATIN CALCIUM 40 MG/1
40 TABLET, FILM COATED ORAL DAILY
Qty: 90 TABLET | Refills: 3 | Status: SHIPPED | OUTPATIENT
Start: 2018-05-14 | End: 2019-05-18 | Stop reason: SDUPTHER

## 2018-05-14 RX ORDER — HEPATITIS A VACCINE 1440 [IU]/ML
INJECTION, SUSPENSION INTRAMUSCULAR
COMMUNITY
Start: 2018-05-03 | End: 2019-05-17

## 2018-05-14 RX ORDER — DUTASTERIDE 0.5 MG/1
0.5 CAPSULE, LIQUID FILLED ORAL DAILY
Qty: 90 CAPSULE | Refills: 3 | Status: SHIPPED | OUTPATIENT
Start: 2018-05-14 | End: 2019-05-18 | Stop reason: SDUPTHER

## 2018-05-14 NOTE — PROGRESS NOTES
Subjective   Ramiro Tovar JR is a 58 y.o. male. Presents today for   Chief Complaint   Patient presents with   • Annual Exam     refills labs and microalbumin.  Has a knot with bruising on rt breast.  Lt great toe smashed yesterday       History of Present Illness  Patient with hx of dm2, htn, hld and s/p colectomy for crohns.   Doing well overall;  Hx of low vitamin D and B12, due for checks.  Has lost 16 lbs overall since last ov, but reports while in [place] weight went up to 315 lbs and so has lost nearly 30 lbs this year.  Eating healthier, a little active but not exercising.  No cp/soa; n o abd pain; no n/v;  occly abd cramping.  Has toe nail smashed and sore.  Bruise medial to right nipple with lump, remembers bumping but didn't think that significant.  Is on asa and wonders if should continue for this reason.   Overall doing well.  Due for wellness labs related to diagnoses.     Review of Systems   Respiratory: Negative for shortness of breath.    Cardiovascular: Negative for chest pain.   Gastrointestinal: Negative for abdominal pain, anal bleeding, blood in stool, diarrhea, nausea and vomiting.   Neurological: Negative for syncope.       The following portions of the patient's history were reviewed and updated as appropriate: allergies, current medications, past medical history, past surgical history and problem list.    Patient Active Problem List   Diagnosis   • Hyperuricemia   • Benign essential hypertension   • Benign prostatic hypertrophy without urinary obstruction   • Chronic pain syndrome   • Crohn's disease of colon   • Dyslipidemia   • Gout   • Weakness of hand   • Knee pain   • Low back pain   • Peripheral neuropathy   • Type 2 diabetes mellitus without complication, without long-term current use of insulin   • Vitamin D deficiency   • Crohn's disease   • History of colectomy   • Hyperlipidemia   • Calculus of kidney   • Adult body mass index greater than 30   • Small bowel obstruction        Allergies   Allergen Reactions   • Sulfa Antibiotics Rash   • Hydromorphone      Affects his chron's disease doesn't want this drug   • Hydromorphone Hcl      Affects his chron's disease doesn't want this drug. Makes him sick and does not take away pain   • Morphine And Related      Has problems with his chron's   • Penicillins        Current Outpatient Prescriptions on File Prior to Visit   Medication Sig Dispense Refill   • Cholecalciferol 1000 UNITS tablet Take  by mouth daily.     • colchicine 0.6 MG tablet Take 1 tablet by mouth Daily. 30 tablet 6   • gabapentin (NEURONTIN) 600 MG tablet Take 1 tablet by mouth 3 (Three) Times a Day. 270 tablet 3   • HYDROcodone-acetaminophen (NORCO)  MG per tablet Take 1 tablet by mouth Every 6 (Six) Hours.     • Multiple Vitamin (MULTI VITAMIN DAILY PO) Take  by mouth daily.     • Omega-3 Fatty Acids (FISH OIL) 1000 MG capsule capsule Take 1 capsule by mouth.     • vitamin B-12 (CYANOCOBALAMIN) 1000 MCG tablet Take  by mouth daily.     • [DISCONTINUED] allopurinol (ZYLOPRIM) 300 MG tablet Take 1 tablet by mouth 2 (Two) Times a Day. 180 tablet 3   • [DISCONTINUED] atorvastatin (LIPITOR) 40 MG tablet Take 1 tablet by mouth Daily. 90 tablet 3   • [DISCONTINUED] clotrimazole-betamethasone (LOTRISONE) 1-0.05 % cream Apply  topically 2 (Two) Times a Day. 60 g 5   • [DISCONTINUED] dicyclomine (BENTYL) 10 MG capsule Take 1 capsule by mouth 4 (Four) Times a Day Before Meals & at Bedtime. 120 capsule 5   • [DISCONTINUED] dutasteride (AVODART) 0.5 MG capsule Take 1 capsule by mouth Daily. 90 capsule 3   • [DISCONTINUED] lisinopril-hydrochlorothiazide (PRINZIDE,ZESTORETIC) 20-12.5 MG per tablet Take 1 tablet by mouth Daily. 90 tablet 3   • [DISCONTINUED] mesalamine (PENTASA) 500 MG CR capsule 2 daily 180 capsule 3   • [DISCONTINUED] ondansetron (ZOFRAN) 4 MG tablet TAKE ONE TABLET BY MOUTH EVERY 6 HOURS AS NEEDED FOR NAUSEA 30 tablet 1   • [DISCONTINUED] SITagliptin-MetFORMIN  HCl -1000 MG tablet sustained-release 24 hour Take 1 tablet by mouth Daily. 90 tablet 3   • aspirin 81 MG tablet Take  by mouth daily.     • [DISCONTINUED] mupirocin (BACTROBAN) 2 % ointment Apply  topically 2 (Two) Times a Day. 22 g 0     No current facility-administered medications on file prior to visit.        Objective   Vitals:    05/14/18 0815   BP: 132/74   Pulse: 56   Temp: 97.6 °F (36.4 °C)   SpO2: 96%   Weight: 124 kg (274 lb)       Physical Exam   Constitutional: He appears well-developed and well-nourished.   HENT:   Head: Normocephalic and atraumatic.   Neck: Neck supple. No JVD present. No thyromegaly present.   Cardiovascular: Normal rate, regular rhythm and normal heart sounds.  Exam reveals no gallop and no friction rub.    No murmur heard.  Pulmonary/Chest: Effort normal and breath sounds normal. No respiratory distress. He has no wheezes. He has no rales.   Abdominal: Soft. Bowel sounds are normal. He exhibits no distension. There is no tenderness. There is no rebound and no guarding.   Musculoskeletal: He exhibits no edema.   Neurological: He is alert.   Skin: Skin is warm and dry.   Right chest wall small bruise with small underalying nodule, ttp;  Right toe nail whitening of nail 2ndary to trauma, no swelling   Psychiatric: He has a normal mood and affect. His behavior is normal.   Nursing note and vitals reviewed.    CBC ordered and reviewed.  MAL/Cr ordered and reviewed.    Assessment/Plan   Ramiro was seen today for annual exam.    Diagnoses and all orders for this visit:    Wellness examination    Crohn's disease of small intestine without complication  -     dicyclomine (BENTYL) 10 MG capsule; Take 1 capsule by mouth 4 (Four) Times a Day Before Meals & at Bedtime.  -     Comprehensive Metabolic Panel  -     Lipid Panel  -     Hemoglobin A1c  -     Vitamin D 25 Hydroxy    Type 2 diabetes mellitus without complication, without long-term current use of insulin  -     POCT  microalbumin  -     Comprehensive Metabolic Panel  -     Lipid Panel  -     Hemoglobin A1c  -     Vitamin D 25 Hydroxy    Benign essential hypertension  -     Comprehensive Metabolic Panel  -     Lipid Panel  -     Hemoglobin A1c  -     Vitamin D 25 Hydroxy    Other hyperlipidemia  -     Comprehensive Metabolic Panel  -     Lipid Panel  -     Hemoglobin A1c  -     Vitamin D 25 Hydroxy    Vitamin D deficiency  -     Comprehensive Metabolic Panel  -     Lipid Panel  -     Hemoglobin A1c  -     Vitamin D 25 Hydroxy    History of colectomy  -     Comprehensive Metabolic Panel  -     Lipid Panel  -     Hemoglobin A1c  -     Vitamin D 25 Hydroxy    Crohn's disease of colon without complication  -     Comprehensive Metabolic Panel  -     Lipid Panel  -     Hemoglobin A1c  -     Vitamin D 25 Hydroxy    B12 deficiency  -     POC CBC    Other orders  -     atorvastatin (LIPITOR) 40 MG tablet; Take 1 tablet by mouth Daily.  -     allopurinol (ZYLOPRIM) 300 MG tablet; Take 1 tablet by mouth Daily.  -     dutasteride (AVODART) 0.5 MG capsule; Take 1 capsule by mouth Daily.  -     lisinopril-hydrochlorothiazide (PRINZIDE,ZESTORETIC) 20-12.5 MG per tablet; Take 1 tablet by mouth Daily.  -     mesalamine (PENTASA) 500 MG CR capsule; 2 daily  -     SITagliptin-MetFORMIN HCl ER (JANUMET XR) 100-1000 MG tablet; Take 1 tablet by mouth Daily.  -     ondansetron (ZOFRAN) 4 MG tablet; Take 1 tablet by mouth Every 6 (Six) Hours As Needed for Nausea. for nausea        Counseled on diet and exercise;  Contineu with weight loss  Check labs as due  -suspect small hematoma - if no resolution 6 to 8 weeks will order u/s - to let me know;  Ice toe  -Recommend continue ASA daily as hx of dm2, htn and hld.       -Follow up: 6 months       Current Outpatient Prescriptions:   •  allopurinol (ZYLOPRIM) 300 MG tablet, Take 1 tablet by mouth Daily., Disp: 90 tablet, Rfl: 3  •  atorvastatin (LIPITOR) 40 MG tablet, Take 1 tablet by mouth Daily., Disp: 90  tablet, Rfl: 3  •  Cholecalciferol 1000 UNITS tablet, Take  by mouth daily., Disp: , Rfl:   •  colchicine 0.6 MG tablet, Take 1 tablet by mouth Daily., Disp: 30 tablet, Rfl: 6  •  dicyclomine (BENTYL) 10 MG capsule, Take 1 capsule by mouth 4 (Four) Times a Day Before Meals & at Bedtime., Disp: 120 capsule, Rfl: 5  •  dutasteride (AVODART) 0.5 MG capsule, Take 1 capsule by mouth Daily., Disp: 90 capsule, Rfl: 3  •  gabapentin (NEURONTIN) 600 MG tablet, Take 1 tablet by mouth 3 (Three) Times a Day., Disp: 270 tablet, Rfl: 3  •  HAVRIX 1440 EL U/ML vaccine, , Disp: , Rfl:   •  HYDROcodone-acetaminophen (NORCO)  MG per tablet, Take 1 tablet by mouth Every 6 (Six) Hours., Disp: , Rfl:   •  lisinopril-hydrochlorothiazide (PRINZIDE,ZESTORETIC) 20-12.5 MG per tablet, Take 1 tablet by mouth Daily., Disp: 90 tablet, Rfl: 3  •  mesalamine (PENTASA) 500 MG CR capsule, 2 daily, Disp: 180 capsule, Rfl: 3  •  Multiple Vitamin (MULTI VITAMIN DAILY PO), Take  by mouth daily., Disp: , Rfl:   •  Omega-3 Fatty Acids (FISH OIL) 1000 MG capsule capsule, Take 1 capsule by mouth., Disp: , Rfl:   •  ondansetron (ZOFRAN) 4 MG tablet, Take 1 tablet by mouth Every 6 (Six) Hours As Needed for Nausea. for nausea, Disp: 30 tablet, Rfl: 1  •  SITagliptin-MetFORMIN HCl ER (JANUMET XR) 100-1000 MG tablet, Take 1 tablet by mouth Daily., Disp: 90 tablet, Rfl: 3  •  vitamin B-12 (CYANOCOBALAMIN) 1000 MCG tablet, Take  by mouth daily., Disp: , Rfl:   •  aspirin 81 MG tablet, Take  by mouth daily., Disp: , Rfl:

## 2018-05-15 LAB
25(OH)D3+25(OH)D2 SERPL-MCNC: 31.7 NG/ML (ref 30–100)
ALBUMIN SERPL-MCNC: 4.3 G/DL (ref 3.5–5.2)
ALBUMIN/GLOB SERPL: 1.9 G/DL
ALP SERPL-CCNC: 55 U/L (ref 39–117)
ALT SERPL-CCNC: 31 U/L (ref 1–41)
AST SERPL-CCNC: 28 U/L (ref 1–40)
BILIRUB SERPL-MCNC: 0.4 MG/DL (ref 0.1–1.2)
BUN SERPL-MCNC: 16 MG/DL (ref 6–20)
BUN/CREAT SERPL: 16.5 (ref 7–25)
CALCIUM SERPL-MCNC: 9.8 MG/DL (ref 8.6–10.5)
CHLORIDE SERPL-SCNC: 103 MMOL/L (ref 98–107)
CHOLEST SERPL-MCNC: 95 MG/DL (ref 0–200)
CO2 SERPL-SCNC: 25.7 MMOL/L (ref 22–29)
CREAT SERPL-MCNC: 0.97 MG/DL (ref 0.76–1.27)
GFR SERPLBLD CREATININE-BSD FMLA CKD-EPI: 79 ML/MIN/1.73
GFR SERPLBLD CREATININE-BSD FMLA CKD-EPI: 96 ML/MIN/1.73
GLOBULIN SER CALC-MCNC: 2.3 GM/DL
GLUCOSE SERPL-MCNC: 110 MG/DL (ref 65–99)
HBA1C MFR BLD: 5.6 % (ref 4.8–5.6)
HDLC SERPL-MCNC: 26 MG/DL (ref 40–60)
LDLC SERPL CALC-MCNC: 42 MG/DL (ref 0–100)
POTASSIUM SERPL-SCNC: 4.4 MMOL/L (ref 3.5–5.2)
PROT SERPL-MCNC: 6.6 G/DL (ref 6–8.5)
SODIUM SERPL-SCNC: 143 MMOL/L (ref 136–145)
TRIGL SERPL-MCNC: 136 MG/DL (ref 0–150)
VLDLC SERPL CALC-MCNC: 27.2 MG/DL (ref 5–40)

## 2018-05-20 NOTE — PROGRESS NOTES
Diabetes is very well controlled.  Cholesterol is very well controlled.  He has significantly improved his numbers with weight loss.  Rest of labs normal or stable.

## 2018-07-23 ENCOUNTER — TELEPHONE (OUTPATIENT)
Dept: FAMILY MEDICINE CLINIC | Facility: CLINIC | Age: 58
End: 2018-07-23

## 2018-10-25 ENCOUNTER — TELEPHONE (OUTPATIENT)
Dept: FAMILY MEDICINE CLINIC | Facility: CLINIC | Age: 58
End: 2018-10-25

## 2018-11-13 ENCOUNTER — OFFICE VISIT (OUTPATIENT)
Dept: FAMILY MEDICINE CLINIC | Facility: CLINIC | Age: 58
End: 2018-11-13

## 2018-11-13 VITALS
TEMPERATURE: 98.3 F | SYSTOLIC BLOOD PRESSURE: 136 MMHG | DIASTOLIC BLOOD PRESSURE: 78 MMHG | HEART RATE: 56 BPM | BODY MASS INDEX: 36.28 KG/M2 | OXYGEN SATURATION: 96 % | WEIGHT: 275 LBS

## 2018-11-13 DIAGNOSIS — M54.50 CHRONIC BILATERAL LOW BACK PAIN WITHOUT SCIATICA: ICD-10-CM

## 2018-11-13 DIAGNOSIS — I10 BENIGN ESSENTIAL HYPERTENSION: ICD-10-CM

## 2018-11-13 DIAGNOSIS — G89.29 CHRONIC BILATERAL LOW BACK PAIN WITHOUT SCIATICA: ICD-10-CM

## 2018-11-13 DIAGNOSIS — E55.9 VITAMIN D DEFICIENCY: ICD-10-CM

## 2018-11-13 DIAGNOSIS — E79.0 HYPERURICEMIA: ICD-10-CM

## 2018-11-13 DIAGNOSIS — E11.9 TYPE 2 DIABETES MELLITUS WITHOUT COMPLICATION, WITHOUT LONG-TERM CURRENT USE OF INSULIN (HCC): Primary | ICD-10-CM

## 2018-11-13 DIAGNOSIS — K50.00 CROHN'S DISEASE OF SMALL INTESTINE WITHOUT COMPLICATION (HCC): ICD-10-CM

## 2018-11-13 DIAGNOSIS — E78.49 OTHER HYPERLIPIDEMIA: ICD-10-CM

## 2018-11-13 DIAGNOSIS — J06.9 ACUTE URI: ICD-10-CM

## 2018-11-13 LAB
25(OH)D3+25(OH)D2 SERPL-MCNC: 33.8 NG/ML (ref 30–100)
ALBUMIN SERPL-MCNC: 4.4 G/DL (ref 3.5–5.2)
ALBUMIN/GLOB SERPL: 1.6 G/DL
ALP SERPL-CCNC: 69 U/L (ref 39–117)
ALT SERPL-CCNC: 31 U/L (ref 1–41)
AST SERPL-CCNC: 21 U/L (ref 1–40)
BILIRUB SERPL-MCNC: 0.3 MG/DL (ref 0.1–1.2)
BUN SERPL-MCNC: 17 MG/DL (ref 6–20)
BUN/CREAT SERPL: 16.3 (ref 7–25)
CALCIUM SERPL-MCNC: 10.2 MG/DL (ref 8.6–10.5)
CHLORIDE SERPL-SCNC: 102 MMOL/L (ref 98–107)
CHOLEST SERPL-MCNC: 119 MG/DL (ref 0–200)
CO2 SERPL-SCNC: 27.5 MMOL/L (ref 22–29)
CREAT SERPL-MCNC: 1.04 MG/DL (ref 0.76–1.27)
GLOBULIN SER CALC-MCNC: 2.8 GM/DL
GLUCOSE SERPL-MCNC: 140 MG/DL (ref 65–99)
HBA1C MFR BLD: 6 % (ref 4.8–5.6)
HDLC SERPL-MCNC: 29 MG/DL (ref 40–60)
LDLC SERPL CALC-MCNC: 43 MG/DL (ref 0–100)
POTASSIUM SERPL-SCNC: 4.2 MMOL/L (ref 3.5–5.2)
PROT SERPL-MCNC: 7.2 G/DL (ref 6–8.5)
SODIUM SERPL-SCNC: 143 MMOL/L (ref 136–145)
TRIGL SERPL-MCNC: 234 MG/DL (ref 0–150)
URATE SERPL-MCNC: 5.6 MG/DL (ref 3.4–7)
VLDLC SERPL CALC-MCNC: 46.8 MG/DL (ref 5–40)

## 2018-11-13 PROCEDURE — 99214 OFFICE O/P EST MOD 30 MIN: CPT | Performed by: FAMILY MEDICINE

## 2018-11-13 RX ORDER — ONDANSETRON 4 MG/1
4 TABLET, FILM COATED ORAL EVERY 6 HOURS PRN
Qty: 90 TABLET | Refills: 1 | Status: SHIPPED | OUTPATIENT
Start: 2018-11-13 | End: 2020-02-03

## 2018-11-13 RX ORDER — BENZONATATE 200 MG/1
200 CAPSULE ORAL 3 TIMES DAILY PRN
Qty: 45 CAPSULE | Refills: 1 | Status: SHIPPED | OUTPATIENT
Start: 2018-11-13 | End: 2019-05-17

## 2018-11-13 NOTE — PROGRESS NOTES
Subjective   Ramiro Tovar JR is a 58 y.o. male. Presents today for   Chief Complaint   Patient presents with   • Follow-up     med check with diabetic foot exam and labs.  Sinus drainage and congestion with sore throat.   • Diabetes   • Hyperlipidemia   • Hypertension       Cough   This is a chronic problem. The current episode started in the past 7 days. The problem has been waxing and waning. The problem occurs constantly. The cough is productive of sputum. Associated symptoms include nasal congestion, postnasal drip, rhinorrhea, a sore throat and wheezing. Pertinent negatives include no chest pain, chills, fever or shortness of breath. He has tried OTC cough suppressant for the symptoms. The treatment provided mild relief.   Diabetes   He presents for his follow-up diabetic visit. He has type 2 diabetes mellitus. His disease course has been stable. Associated symptoms include foot paresthesias (toes still tingles and hurts, had shingles down into foot). Pertinent negatives for diabetes include no chest pain and no foot ulcerations. Symptoms are stable. Pertinent negatives for diabetic complications include no CVA. Risk factors for coronary artery disease include diabetes mellitus, dyslipidemia, hypertension, male sex and obesity. Current diabetic treatment includes oral agent (dual therapy). He is compliant with treatment all of the time. An ACE inhibitor/angiotensin II receptor blocker is being taken. Eye exam is current.   Hypertension   This is a chronic problem. The current episode started more than 1 year ago. The problem is unchanged. The problem is controlled. Pertinent negatives include no chest pain, orthopnea, palpitations or shortness of breath. There are no associated agents to hypertension. Risk factors for coronary artery disease include diabetes mellitus, dyslipidemia, obesity and male gender. Past treatments include ACE inhibitors and diuretics. Current antihypertension treatment includes  diuretics and ACE inhibitors. The current treatment provides moderate improvement. There are no compliance problems.  There is no history of kidney disease, CAD/MI, CVA or heart failure. There is no history of chronic renal disease.   Hyperlipidemia   This is a chronic problem. The current episode started more than 1 year ago. The problem is controlled. Recent lipid tests were reviewed and are normal. Exacerbating diseases include diabetes. He has no history of chronic renal disease. Pertinent negatives include no chest pain or shortness of breath. Current antihyperlipidemic treatment includes statins. The current treatment provides moderate improvement of lipids. There are no compliance problems.  Risk factors for coronary artery disease include dyslipidemia, diabetes mellitus, hypertension, male sex and obesity.   GOUT  No flares;  Doing well  Has chronic back pain and crohns;  Has off/on issues;  Has been seeing Dr. Shields, but took Dads thought HC but was oxycodone and failed UDS, so discharged.  Will refer new.  Had low vitamin D in past.  Review of Systems   Constitutional: Negative for chills and fever.   HENT: Positive for postnasal drip, rhinorrhea and sore throat.    Respiratory: Positive for cough and wheezing. Negative for shortness of breath.    Cardiovascular: Negative for chest pain, palpitations and orthopnea.       Patient Active Problem List   Diagnosis   • Hyperuricemia   • Benign essential hypertension   • Benign prostatic hypertrophy without urinary obstruction   • Chronic pain syndrome   • Crohn's disease of colon (CMS/HCC)   • Dyslipidemia   • Gout   • Weakness of hand   • Knee pain   • Low back pain   • Peripheral neuropathy   • Type 2 diabetes mellitus without complication, without long-term current use of insulin (CMS/HCC)   • Vitamin D deficiency   • Crohn's disease (CMS/HCC)   • History of colectomy   • Hyperlipidemia   • Calculus of kidney   • Adult body mass index greater than 30   •  Small bowel obstruction (CMS/HCC)       Social History     Socioeconomic History   • Marital status:      Spouse name: Not on file   • Number of children: Not on file   • Years of education: Not on file   • Highest education level: Not on file   Tobacco Use   • Smoking status: Never Smoker   • Smokeless tobacco: Never Used   Substance and Sexual Activity   • Alcohol use: Yes     Comment: RARE   • Drug use: No       Allergies   Allergen Reactions   • Sulfa Antibiotics Rash   • Hydromorphone      Affects his chron's disease doesn't want this drug   • Hydromorphone Hcl      Affects his chron's disease doesn't want this drug. Makes him sick and does not take away pain   • Morphine And Related Nausea And Vomiting     Has problems with his chron's  Has problems with his chron's. Makes him sick and does not take away pain; makes crohn's worse   • Penicillins Rash     UNKNOWN-CHILDHOOD       Current Outpatient Medications on File Prior to Visit   Medication Sig Dispense Refill   • allopurinol (ZYLOPRIM) 300 MG tablet Take 1 tablet by mouth Daily. 90 tablet 3   • aspirin 81 MG tablet Take  by mouth daily.     • atorvastatin (LIPITOR) 40 MG tablet Take 1 tablet by mouth Daily. 90 tablet 3   • Cholecalciferol 1000 UNITS tablet Take  by mouth daily.     • colchicine 0.6 MG tablet Take 1 tablet by mouth Daily. 30 tablet 6   • dicyclomine (BENTYL) 10 MG capsule Take 1 capsule by mouth 4 (Four) Times a Day Before Meals & at Bedtime. 120 capsule 5   • dutasteride (AVODART) 0.5 MG capsule Take 1 capsule by mouth Daily. 90 capsule 3   • gabapentin (NEURONTIN) 600 MG tablet Take 1 tablet by mouth 3 (Three) Times a Day. 270 tablet 3   • HAVRIX 1440 EL U/ML vaccine      • lisinopril-hydrochlorothiazide (PRINZIDE,ZESTORETIC) 20-12.5 MG per tablet Take 1 tablet by mouth Daily. 90 tablet 3   • mesalamine (PENTASA) 500 MG CR capsule 2 daily 180 capsule 3   • Multiple Vitamin (MULTI VITAMIN DAILY PO) Take  by mouth daily.     •  Omega-3 Fatty Acids (FISH OIL) 1000 MG capsule capsule Take 1 capsule by mouth.     • SITagliptin-MetFORMIN HCl ER (JANUMET XR) 100-1000 MG tablet Take 1 tablet by mouth Daily. 90 tablet 3   • vitamin B-12 (CYANOCOBALAMIN) 1000 MCG tablet Take  by mouth daily.     • [DISCONTINUED] ondansetron (ZOFRAN) 4 MG tablet Take 1 tablet by mouth Every 6 (Six) Hours As Needed for Nausea. for nausea 30 tablet 1   • [DISCONTINUED] HYDROcodone-acetaminophen (NORCO)  MG per tablet Take 1 tablet by mouth Every 6 (Six) Hours.       No current facility-administered medications on file prior to visit.        Objective   Vitals:    11/13/18 0751   BP: 136/78   Pulse: 56   Temp: 98.3 °F (36.8 °C)   SpO2: 96%   Weight: 125 kg (275 lb)       Physical Exam   Constitutional: He appears well-developed and well-nourished.   HENT:   Head: Normocephalic and atraumatic.   Right Ear: Tympanic membrane normal.   Left Ear: Tympanic membrane normal.   Nose: Mucosal edema present.   Mouth/Throat: Uvula is midline, oropharynx is clear and moist and mucous membranes are normal.   Neck: Neck supple. No JVD present. No thyromegaly present.   Cardiovascular: Normal rate, regular rhythm and normal heart sounds. Exam reveals no gallop and no friction rub.   No murmur heard.  Pulmonary/Chest: Effort normal and breath sounds normal. No respiratory distress. He has no wheezes. He has no rales.   Abdominal: Soft. Bowel sounds are normal. He exhibits no distension. There is no tenderness. There is no rebound and no guarding.   Musculoskeletal: He exhibits no edema.    Ramiro had a diabetic foot exam performed (see scanned report) today.   During the foot exam he had a monofilament test performed.  Neurological: He is alert.   Skin: Skin is warm and dry.   Psychiatric: He has a normal mood and affect. His behavior is normal.   Nursing note and vitals reviewed.      Assessment/Plan   Ramiro was seen today for follow-up, diabetes, hyperlipidemia and  hypertension.    Diagnoses and all orders for this visit:    Type 2 diabetes mellitus without complication, without long-term current use of insulin (CMS/[facility])  -     Comprehensive Metabolic Panel  -     Hemoglobin A1c  -     Lipid Panel  -     Vitamin D 25 Hydroxy  -     Uric Acid    Crohn's disease of small intestine without complication (CMS/[facility])  -     Ambulatory Referral to Pain Management    Other hyperlipidemia  -     Comprehensive Metabolic Panel  -     Hemoglobin A1c  -     Lipid Panel  -     Vitamin D 25 Hydroxy  -     Uric Acid    Benign essential hypertension  -     Comprehensive Metabolic Panel  -     Hemoglobin A1c  -     Lipid Panel  -     Vitamin D 25 Hydroxy  -     Uric Acid    Hyperuricemia  -     Comprehensive Metabolic Panel  -     Hemoglobin A1c  -     Lipid Panel  -     Vitamin D 25 Hydroxy  -     Uric Acid    Vitamin D deficiency  -     Comprehensive Metabolic Panel  -     Hemoglobin A1c  -     Lipid Panel  -     Vitamin D 25 Hydroxy  -     Uric Acid    Acute URI    Chronic bilateral low back pain without sciatica  -     Ambulatory Referral to Pain Management    Other orders  -     ondansetron (ZOFRAN) 4 MG tablet; Take 1 tablet by mouth Every 6 (Six) Hours As Needed for Nausea. for nausea  -     benzonatate (TESSALON) 200 MG capsule; Take 1 capsule by mouth 3 (Three) Times a Day As Needed for Cough.  -     HYDROcod Polst-CPM Polst ER (TUSSIONEX PENNKINETIC ER) 10-8 MG/5ML ER suspension; Take 5 mL by mouth Every 12 (Twelve) Hours As Needed for Cough.    -URI - push fluids;  Nasal saline rinses as directed  -dm2 - check labs;  Continue medications  -hypertension - controlled, continue medications  -HLD - continue statin, recheck lipids.  -hyperuricemia - recheck, continue allopurinol  -vitamin D deficiency - due recheck  -chronic back pain - needs new pain specialist;  Refer           -Follow up:

## 2019-05-17 ENCOUNTER — OFFICE VISIT (OUTPATIENT)
Dept: FAMILY MEDICINE CLINIC | Facility: CLINIC | Age: 59
End: 2019-05-17

## 2019-05-17 VITALS
OXYGEN SATURATION: 95 % | DIASTOLIC BLOOD PRESSURE: 70 MMHG | WEIGHT: 291 LBS | SYSTOLIC BLOOD PRESSURE: 126 MMHG | HEIGHT: 71 IN | BODY MASS INDEX: 40.74 KG/M2 | HEART RATE: 74 BPM

## 2019-05-17 DIAGNOSIS — K21.9 GASTROESOPHAGEAL REFLUX DISEASE, ESOPHAGITIS PRESENCE NOT SPECIFIED: ICD-10-CM

## 2019-05-17 DIAGNOSIS — I10 BENIGN ESSENTIAL HYPERTENSION: ICD-10-CM

## 2019-05-17 DIAGNOSIS — E79.0 HYPERURICEMIA: ICD-10-CM

## 2019-05-17 DIAGNOSIS — K50.00 CROHN'S DISEASE OF SMALL INTESTINE WITHOUT COMPLICATION (HCC): ICD-10-CM

## 2019-05-17 DIAGNOSIS — E78.5 DYSLIPIDEMIA: ICD-10-CM

## 2019-05-17 DIAGNOSIS — E55.9 VITAMIN D DEFICIENCY: ICD-10-CM

## 2019-05-17 DIAGNOSIS — E11.9 TYPE 2 DIABETES MELLITUS WITHOUT COMPLICATION, WITHOUT LONG-TERM CURRENT USE OF INSULIN (HCC): Primary | ICD-10-CM

## 2019-05-17 DIAGNOSIS — N52.1 ERECTILE DYSFUNCTION DUE TO DISEASES CLASSIFIED ELSEWHERE: ICD-10-CM

## 2019-05-17 DIAGNOSIS — E78.49 OTHER HYPERLIPIDEMIA: ICD-10-CM

## 2019-05-17 PROCEDURE — 99214 OFFICE O/P EST MOD 30 MIN: CPT | Performed by: FAMILY MEDICINE

## 2019-05-17 RX ORDER — HYDROCODONE BITARTRATE AND ACETAMINOPHEN 10; 325 MG/1; MG/1
1 TABLET ORAL 3 TIMES DAILY PRN
Refills: 0 | COMMUNITY
Start: 2019-04-04

## 2019-05-17 RX ORDER — PANTOPRAZOLE SODIUM 40 MG/1
40 TABLET, DELAYED RELEASE ORAL DAILY
Qty: 30 TABLET | Refills: 5 | Status: SHIPPED | OUTPATIENT
Start: 2019-05-17 | End: 2019-10-24 | Stop reason: SDUPTHER

## 2019-05-17 RX ORDER — TADALAFIL 20 MG/1
20 TABLET ORAL DAILY PRN
Qty: 10 TABLET | Refills: 5 | Status: SHIPPED | OUTPATIENT
Start: 2019-05-17 | End: 2019-10-24

## 2019-05-17 NOTE — PROGRESS NOTES
Subjective   Ramiro Tovar JR is a 59 y.o. male. Presents today for   Chief Complaint   Patient presents with   • Cough     dry   • Diabetes   • Hypertension   • Hyperlipidemia       Diabetes   He presents for his follow-up diabetic visit. He has type 2 diabetes mellitus. His disease course has been stable. Pertinent negatives for diabetes include no chest pain, no foot paresthesias and no foot ulcerations. Symptoms are stable. Pertinent negatives for diabetic complications include no CVA. Risk factors for coronary artery disease include diabetes mellitus, male sex, dyslipidemia and obesity. He is compliant with treatment most of the time. He is following a diabetic diet. An ACE inhibitor/angiotensin II receptor blocker is being taken.   Cough   This is a chronic problem. The current episode started more than 1 month ago. The problem has been waxing and waning. The problem occurs constantly. The cough is non-productive. Associated symptoms include heartburn. Pertinent negatives include no chest pain, fever, nasal congestion, shortness of breath or wheezing. Associated symptoms comments: Is having belching and noting a lot of reflux.. Treatments tried: OTC antacids. The treatment provided mild relief.   Hypertension   This is a chronic problem. The current episode started more than 1 year ago. The problem is unchanged. The problem is controlled. Pertinent negatives include no chest pain, orthopnea, palpitations, peripheral edema, PND or shortness of breath. There are no associated agents to hypertension. Risk factors for coronary artery disease include dyslipidemia, diabetes mellitus and post-menopausal state. Past treatments include ACE inhibitors and diuretics. Current antihypertension treatment includes diuretics and ACE inhibitors. The current treatment provides moderate improvement. There are no compliance problems.  There is no history of kidney disease, CAD/MI or CVA. There is no history of chronic renal  disease.   Hyperlipidemia   This is a chronic problem. The current episode started more than 1 year ago. The problem is controlled. Recent lipid tests were reviewed and are normal. Exacerbating diseases include diabetes and obesity. He has no history of chronic renal disease. Factors aggravating his hyperlipidemia include thiazides. Pertinent negatives include no chest pain or shortness of breath. Current antihyperlipidemic treatment includes statins. The current treatment provides moderate improvement of lipids. There are no compliance problems.  Risk factors for coronary artery disease include dyslipidemia, diabetes mellitus, hypertension, male sex and obesity.     Hx of low vitamin D, due for recheck;  Hyperuricemia due for recheck;  Has crohns, sees Dr. Sotomayor, due to see back;  D/w let know having more GI issues, reports hx of PUD.  Reports ED, viagra no relief;  Would like try something else.    Review of Systems   Constitutional: Negative for fever.   Respiratory: Positive for cough. Negative for shortness of breath and wheezing.    Cardiovascular: Negative for chest pain, palpitations, orthopnea and PND.   Gastrointestinal: Positive for heartburn.       Patient Active Problem List   Diagnosis   • Hyperuricemia   • Benign essential hypertension   • Benign prostatic hypertrophy without urinary obstruction   • Chronic pain syndrome   • Crohn's disease of colon (CMS/HCC)   • Dyslipidemia   • Gout   • Weakness of hand   • Knee pain   • Low back pain   • Peripheral neuropathy   • Type 2 diabetes mellitus without complication, without long-term current use of insulin (CMS/HCC)   • Vitamin D deficiency   • Crohn's disease (CMS/HCC)   • History of colectomy   • Hyperlipidemia   • Calculus of kidney   • Adult body mass index greater than 30   • Small bowel obstruction (CMS/HCC)       Social History     Socioeconomic History   • Marital status:      Spouse name: Not on file   • Number of children: Not on file    • Years of education: Not on file   • Highest education level: Not on file   Tobacco Use   • Smoking status: Never Smoker   • Smokeless tobacco: Never Used   Substance and Sexual Activity   • Alcohol use: Yes     Comment: RARE   • Drug use: No       Allergies   Allergen Reactions   • Sulfa Antibiotics Rash   • Hydromorphone      Affects his chron's disease doesn't want this drug   • Hydromorphone Hcl      Affects his chron's disease doesn't want this drug. Makes him sick and does not take away pain   • Morphine And Related Nausea And Vomiting     Has problems with his chron's  Has problems with his chron's. Makes him sick and does not take away pain; makes crohn's worse   • Penicillins Rash     UNKNOWN-CHILDHOOD       Current Outpatient Medications on File Prior to Visit   Medication Sig Dispense Refill   • allopurinol (ZYLOPRIM) 300 MG tablet Take 1 tablet by mouth Daily. 90 tablet 3   • aspirin 81 MG tablet Take  by mouth daily.     • atorvastatin (LIPITOR) 40 MG tablet Take 1 tablet by mouth Daily. 90 tablet 3   • colchicine 0.6 MG tablet Take 1 tablet by mouth Daily. 30 tablet 6   • dicyclomine (BENTYL) 10 MG capsule Take 1 capsule by mouth 4 (Four) Times a Day Before Meals & at Bedtime. 120 capsule 5   • dutasteride (AVODART) 0.5 MG capsule Take 1 capsule by mouth Daily. 90 capsule 3   • gabapentin (NEURONTIN) 600 MG tablet Take 1 tablet by mouth 3 (Three) Times a Day. 270 tablet 3   • HYDROcodone-acetaminophen (NORCO)  MG per tablet Take 1 tablet by mouth 3 (Three) Times a Day As Needed. for pain  0   • lisinopril-hydrochlorothiazide (PRINZIDE,ZESTORETIC) 20-12.5 MG per tablet Take 1 tablet by mouth Daily. 90 tablet 3   • mesalamine (PENTASA) 500 MG CR capsule 2 daily 180 capsule 3   • Multiple Vitamin (MULTI VITAMIN DAILY PO) Take  by mouth daily.     • Omega-3 Fatty Acids (FISH OIL) 1000 MG capsule capsule Take 1 capsule by mouth.     • ondansetron (ZOFRAN) 4 MG tablet Take 1 tablet by mouth Every 6  "(Six) Hours As Needed for Nausea. for nausea 90 tablet 1   • SITagliptin-MetFORMIN HCl ER (JANUMET XR) 100-1000 MG tablet Take 1 tablet by mouth Daily. 90 tablet 3   • vitamin B-12 (CYANOCOBALAMIN) 1000 MCG tablet Take  by mouth daily.     • [DISCONTINUED] benzonatate (TESSALON) 200 MG capsule Take 1 capsule by mouth 3 (Three) Times a Day As Needed for Cough. 45 capsule 1   • [DISCONTINUED] Cholecalciferol 1000 UNITS tablet Take  by mouth daily.     • [DISCONTINUED] HAVRIX 1440 EL U/ML vaccine      • [DISCONTINUED] HYDROcod Polst-CPM Polst ER (TUSSIONEX PENNKINETIC ER) 10-8 MG/5ML ER suspension Take 5 mL by mouth Every 12 (Twelve) Hours As Needed for Cough. 115 mL 0     No current facility-administered medications on file prior to visit.        Objective   Vitals:    05/17/19 0756   BP: 126/70   BP Location: Right arm   Patient Position: Sitting   Cuff Size: Large Adult   Pulse: 74   SpO2: 95%   Weight: 109 kg (241 lb)   Height: 180.3 cm (71\")       Physical Exam   Constitutional: He appears well-developed and well-nourished.   HENT:   Head: Normocephalic and atraumatic.   Neck: Neck supple. No JVD present. No thyromegaly present.   Cardiovascular: Normal rate, regular rhythm and normal heart sounds. Exam reveals no gallop and no friction rub.   No murmur heard.  Pulmonary/Chest: Effort normal and breath sounds normal. No respiratory distress. He has no wheezes. He has no rales.   Abdominal: Soft. Bowel sounds are normal. He exhibits no distension. There is no tenderness. There is no rebound and no guarding.   Musculoskeletal: He exhibits no edema.   Neurological: He is alert.   Skin: Skin is warm and dry.   Psychiatric: He has a normal mood and affect. His behavior is normal.   Nursing note and vitals reviewed.      Assessment/Plan   Ramiro was seen today for cough, diabetes, hypertension and hyperlipidemia.    Diagnoses and all orders for this visit:    Type 2 diabetes mellitus without complication, without " long-term current use of insulin (CMS/[facility])  -     Comprehensive Metabolic Panel  -     Hemoglobin A1c  -     Lipid Panel  -     CBC & Differential  -     Uric Acid  -     Vitamin D 25 Hydroxy    Crohn's disease of small intestine without complication (CMS/[facility])  -     Comprehensive Metabolic Panel  -     Hemoglobin A1c  -     Lipid Panel  -     CBC & Differential  -     Uric Acid  -     Vitamin D 25 Hydroxy    Benign essential hypertension  -     Comprehensive Metabolic Panel  -     Hemoglobin A1c  -     Lipid Panel  -     CBC & Differential  -     Uric Acid  -     Vitamin D 25 Hydroxy    Other hyperlipidemia  -     Comprehensive Metabolic Panel  -     Hemoglobin A1c  -     Lipid Panel  -     CBC & Differential  -     Uric Acid  -     Vitamin D 25 Hydroxy    Hyperuricemia  -     Comprehensive Metabolic Panel  -     Hemoglobin A1c  -     Lipid Panel  -     CBC & Differential  -     Uric Acid  -     Vitamin D 25 Hydroxy    Dyslipidemia  -     Comprehensive Metabolic Panel  -     Hemoglobin A1c  -     Lipid Panel  -     CBC & Differential  -     Uric Acid  -     Vitamin D 25 Hydroxy    Vitamin D deficiency  -     Comprehensive Metabolic Panel  -     Hemoglobin A1c  -     Lipid Panel  -     CBC & Differential  -     Uric Acid  -     Vitamin D 25 Hydroxy    Gastroesophageal reflux disease, esophagitis presence not specified  -     pantoprazole (PROTONIX) 40 MG EC tablet; Take 1 tablet by mouth Daily.  -     H. Pylori Breath Test - Breath, Lung    Erectile dysfunction due to diseases classified elsewhere  -     tadalafil (CIALIS) 20 MG tablet; Take 1 tablet by mouth Daily As Needed for erectile dysfunction.    -suspect cough GERD related, will start ppi and have check h pylori;  Recommend f/u with GI  -hypertension - controlled, continue medications  -HLD - continue statin, recheck lipids.  -dm2 - regained weight loss;  Go back to exercise           -Follow up: 6 months and prn

## 2019-05-18 LAB
25(OH)D3+25(OH)D2 SERPL-MCNC: 36.1 NG/ML (ref 30–100)
ALBUMIN SERPL-MCNC: 3.9 G/DL (ref 3.5–5.2)
ALBUMIN/GLOB SERPL: 1.3 G/DL
ALP SERPL-CCNC: 63 U/L (ref 39–117)
ALT SERPL-CCNC: 63 U/L (ref 1–41)
AST SERPL-CCNC: 48 U/L (ref 1–40)
BASOPHILS # BLD AUTO: 0.05 10*3/MM3 (ref 0–0.2)
BASOPHILS NFR BLD AUTO: 0.5 % (ref 0–1.5)
BILIRUB SERPL-MCNC: 0.3 MG/DL (ref 0.2–1.2)
BUN SERPL-MCNC: 18 MG/DL (ref 6–20)
BUN/CREAT SERPL: 17.5 (ref 7–25)
CALCIUM SERPL-MCNC: 10.4 MG/DL (ref 8.6–10.5)
CHLORIDE SERPL-SCNC: 102 MMOL/L (ref 98–107)
CHOLEST SERPL-MCNC: 110 MG/DL (ref 0–200)
CO2 SERPL-SCNC: 25.9 MMOL/L (ref 22–29)
CREAT SERPL-MCNC: 1.03 MG/DL (ref 0.76–1.27)
EOSINOPHIL # BLD AUTO: 0.17 10*3/MM3 (ref 0–0.4)
EOSINOPHIL NFR BLD AUTO: 1.8 % (ref 0.3–6.2)
ERYTHROCYTE [DISTWIDTH] IN BLOOD BY AUTOMATED COUNT: 17.2 % (ref 12.3–15.4)
GLOBULIN SER CALC-MCNC: 3 GM/DL
GLUCOSE SERPL-MCNC: 150 MG/DL (ref 65–99)
HBA1C MFR BLD: 6.9 % (ref 4.8–5.6)
HCT VFR BLD AUTO: 43.7 % (ref 37.5–51)
HDLC SERPL-MCNC: 29 MG/DL (ref 40–60)
HGB BLD-MCNC: 12.8 G/DL (ref 13–17.7)
IMM GRANULOCYTES # BLD AUTO: 0.03 10*3/MM3 (ref 0–0.05)
IMM GRANULOCYTES NFR BLD AUTO: 0.3 % (ref 0–0.5)
LDLC SERPL CALC-MCNC: 51 MG/DL (ref 0–100)
LYMPHOCYTES # BLD AUTO: 0.8 10*3/MM3 (ref 0.7–3.1)
LYMPHOCYTES NFR BLD AUTO: 8.6 % (ref 19.6–45.3)
MCH RBC QN AUTO: 25.2 PG (ref 26.6–33)
MCHC RBC AUTO-ENTMCNC: 29.3 G/DL (ref 31.5–35.7)
MCV RBC AUTO: 86.2 FL (ref 79–97)
MONOCYTES # BLD AUTO: 0.6 10*3/MM3 (ref 0.1–0.9)
MONOCYTES NFR BLD AUTO: 6.4 % (ref 5–12)
NEUTROPHILS # BLD AUTO: 7.69 10*3/MM3 (ref 1.7–7)
NEUTROPHILS NFR BLD AUTO: 82.4 % (ref 42.7–76)
NRBC BLD AUTO-RTO: 0 /100 WBC (ref 0–0.2)
PLATELET # BLD AUTO: 196 10*3/MM3 (ref 140–450)
POTASSIUM SERPL-SCNC: 4.5 MMOL/L (ref 3.5–5.2)
PROT SERPL-MCNC: 6.9 G/DL (ref 6–8.5)
RBC # BLD AUTO: 5.07 10*6/MM3 (ref 4.14–5.8)
SODIUM SERPL-SCNC: 142 MMOL/L (ref 136–145)
TRIGL SERPL-MCNC: 149 MG/DL (ref 0–150)
URATE SERPL-MCNC: 5.3 MG/DL (ref 3.4–7)
VLDLC SERPL CALC-MCNC: 29.8 MG/DL
WBC # BLD AUTO: 9.34 10*3/MM3 (ref 3.4–10.8)

## 2019-05-20 LAB — UREA BREATH TEST QL: NEGATIVE

## 2019-05-20 RX ORDER — ATORVASTATIN CALCIUM 40 MG/1
TABLET, FILM COATED ORAL
Qty: 90 TABLET | Refills: 1 | Status: SHIPPED | OUTPATIENT
Start: 2019-05-20 | End: 2020-02-03

## 2019-05-20 RX ORDER — DUTASTERIDE 0.5 MG/1
CAPSULE, LIQUID FILLED ORAL
Qty: 90 CAPSULE | Refills: 1 | Status: SHIPPED | OUTPATIENT
Start: 2019-05-20 | End: 2020-02-03

## 2019-05-20 RX ORDER — MESALAMINE 500 MG/1
CAPSULE, EXTENDED RELEASE ORAL
Qty: 180 CAPSULE | Refills: 1 | Status: SHIPPED | OUTPATIENT
Start: 2019-05-20 | End: 2020-02-03

## 2019-05-20 RX ORDER — ALLOPURINOL 300 MG/1
TABLET ORAL
Qty: 180 TABLET | Refills: 1 | Status: SHIPPED | OUTPATIENT
Start: 2019-05-20 | End: 2019-11-26 | Stop reason: DRUGHIGH

## 2019-05-20 RX ORDER — LISINOPRIL AND HYDROCHLOROTHIAZIDE 20; 12.5 MG/1; MG/1
TABLET ORAL
Qty: 90 TABLET | Refills: 1 | Status: SHIPPED | OUTPATIENT
Start: 2019-05-20 | End: 2019-11-04 | Stop reason: SDUPTHER

## 2019-05-20 NOTE — PROGRESS NOTES
Diabetes is adequately controlled.  a1c has risen a little since gained weight  Cholesterol is adequately controlled.  H. Pylori breath test was negative  Liver enzymes mildly elevated, recommend work on weight loss.  Rest of labs normal or stable.

## 2019-05-31 ENCOUNTER — TELEPHONE (OUTPATIENT)
Dept: FAMILY MEDICINE CLINIC | Facility: CLINIC | Age: 59
End: 2019-05-31

## 2019-05-31 NOTE — TELEPHONE ENCOUNTER
Pt wants to verify that the Janumet 50-1000mg and was told to take 2 times a day. He normally takes Janumet -1000mg daily. Is this okay for him to take the Janument 50-1000mg BID?  He wants to make sure he is not taking to much of the med. Please advise.    Per pt, okay to leave detailed msg on vm if he doesn't answer.

## 2019-06-07 ENCOUNTER — INPATIENT HOSPITAL (OUTPATIENT)
Dept: URBAN - METROPOLITAN AREA HOSPITAL 107 | Facility: HOSPITAL | Age: 59
End: 2019-06-07
Payer: COMMERCIAL

## 2019-06-07 DIAGNOSIS — K50.90 CROHN'S DISEASE, UNSPECIFIED, WITHOUT COMPLICATIONS: ICD-10-CM

## 2019-06-07 PROCEDURE — 99254 IP/OBS CNSLTJ NEW/EST MOD 60: CPT

## 2019-06-08 ENCOUNTER — INPATIENT HOSPITAL (OUTPATIENT)
Dept: URBAN - METROPOLITAN AREA HOSPITAL 107 | Facility: HOSPITAL | Age: 59
End: 2019-06-08
Payer: COMMERCIAL

## 2019-06-08 DIAGNOSIS — K56.699 OTHER INTESTINAL OBSTRUCTION UNSPECIFIED AS TO PARTIAL VERSU: ICD-10-CM

## 2019-06-08 DIAGNOSIS — R14.3 FLATULENCE: ICD-10-CM

## 2019-06-08 DIAGNOSIS — K50.90 CROHN'S DISEASE, UNSPECIFIED, WITHOUT COMPLICATIONS: ICD-10-CM

## 2019-06-08 PROCEDURE — 99232 SBSQ HOSP IP/OBS MODERATE 35: CPT | Performed by: INTERNAL MEDICINE

## 2019-06-09 PROCEDURE — 99231 SBSQ HOSP IP/OBS SF/LOW 25: CPT | Performed by: INTERNAL MEDICINE

## 2019-10-24 ENCOUNTER — OFFICE VISIT (OUTPATIENT)
Dept: GASTROENTEROLOGY | Facility: CLINIC | Age: 59
End: 2019-10-24

## 2019-10-24 VITALS
TEMPERATURE: 97.9 F | SYSTOLIC BLOOD PRESSURE: 146 MMHG | WEIGHT: 297.4 LBS | BODY MASS INDEX: 39.42 KG/M2 | HEIGHT: 73 IN | DIASTOLIC BLOOD PRESSURE: 88 MMHG

## 2019-10-24 DIAGNOSIS — K21.9 GASTROESOPHAGEAL REFLUX DISEASE, ESOPHAGITIS PRESENCE NOT SPECIFIED: ICD-10-CM

## 2019-10-24 DIAGNOSIS — K50.012 CROHN'S DISEASE OF SMALL INTESTINE WITH INTESTINAL OBSTRUCTION (HCC): Primary | ICD-10-CM

## 2019-10-24 PROCEDURE — 99204 OFFICE O/P NEW MOD 45 MIN: CPT | Performed by: INTERNAL MEDICINE

## 2019-10-24 RX ORDER — PANTOPRAZOLE SODIUM 40 MG/1
40 TABLET, DELAYED RELEASE ORAL DAILY
Qty: 30 TABLET | Refills: 5 | Status: SHIPPED | OUTPATIENT
Start: 2019-10-24 | End: 2020-04-20 | Stop reason: SDUPTHER

## 2019-10-24 NOTE — PROGRESS NOTES
Subjective   Chief Complaint   Patient presents with   • Crohn's Disease       Ramiro Tovar JR is a  59 y.o. male here for a follow up visit for Crohn's disease.     She has a history of Crohn's disease of the small intestine with previous small bowel obstructions.  He last had a CT enterography in June 2019 that showed a thickened segment of small intestine without obstruction.  Prior to this study he had had a small bowel obstruction that was thought to be secondary to adhesions also in June 2019.  Has a history of prior stricturoplasty performed by Dr. Alonso Stanford.    Patient was previously followed by Dr. Sandro Sotomayor.  He has been treated with Pentasa in the past.    Tried remicade in the past but caused bone pain.  Tried humira in the past - put him in remission but symptoms recurred.    NO diarrhea or abdominal pain except with flares.  He has intermittent bouts of nausea and vomiting that are intractable and associated with abdominal pain.  He will use Bentyl and pain medication and try to sleep it off at times.  He says that 60 to 70% of the time he can avoid hospitalization with these means.  However he has had multiple small bowel obstructions requiring admission.    He reports worsening heartburn of late.  His pantoprazole was not approved for daily use after the first 90 days and so he was only taking it as needed.  He complains of issues at night.  He was taking pantoprazole after dinner.  No issues with swallowing.    He denies any arthralgias except wear and tear type arthritis pain.  No recent fevers chills or infections.  No rash or eye pain.  He is up-to-date on vaccinations with the exception of his flu vaccine.  HPI  Past Medical History:   Diagnosis Date   • Aspirin long-term use    • Asymptomatic hyperuricemia    • BPH (benign prostatic hyperplasia)    • Chronic pain syndrome    • Crohn's colitis (CMS/HCC)    • Diabetes (CMS/HCC)    • Drug therapy    • Dyslipidemia    • Gout    • Hand  weakness    • Herpes zoster    • Hypertension    • Knee pain    • Low back pain    • Obesity due to excess calories    • Peripheral neuropathy    • Personal history of gout    • Type 2 diabetes mellitus (CMS/HCC)    • Vitamin D deficiency      Past Surgical History:   Procedure Laterality Date   • CHOLECYSTECTOMY     • COLONOSCOPY  2014   • INTESTINAL BYPASS      intestinal stricturoplasty   • UPPER GASTROINTESTINAL ENDOSCOPY  2014       Current Outpatient Medications:   •  allopurinol (ZYLOPRIM) 300 MG tablet, Take 1 tablet by mouth Daily., Disp: 90 tablet, Rfl: 3  •  allopurinol (ZYLOPRIM) 300 MG tablet, TAKE ONE TABLET BY MOUTH TWICE DAILY, Disp: 180 tablet, Rfl: 1  •  aspirin 81 MG tablet, Take  by mouth daily., Disp: , Rfl:   •  atorvastatin (LIPITOR) 40 MG tablet, TAKE 1 TABLET BY MOUTH ONCE DAILY, Disp: 90 tablet, Rfl: 1  •  colchicine 0.6 MG tablet, Take 1 tablet by mouth Daily., Disp: 30 tablet, Rfl: 6  •  dicyclomine (BENTYL) 10 MG capsule, Take 1 capsule by mouth 4 (Four) Times a Day Before Meals & at Bedtime., Disp: 120 capsule, Rfl: 5  •  dutasteride (AVODART) 0.5 MG capsule, TAKE 1 CAPSULE BY MOUTH ONCE DAILY, Disp: 90 capsule, Rfl: 1  •  HYDROcodone-acetaminophen (NORCO)  MG per tablet, Take 1 tablet by mouth 3 (Three) Times a Day As Needed. for pain, Disp: , Rfl: 0  •  lisinopril-hydrochlorothiazide (PRINZIDE,ZESTORETIC) 20-12.5 MG per tablet, TAKE 1 TABLET BY MOUTH ONCE DAILY, Disp: 90 tablet, Rfl: 1  •  mesalamine (PENTASA) 500 MG CR capsule, TAKE 2 CAPSULES BY MOUTH ONCE DAILY, Disp: 180 capsule, Rfl: 1  •  Multiple Vitamin (MULTI VITAMIN DAILY PO), Take  by mouth daily., Disp: , Rfl:   •  Omega-3 Fatty Acids (FISH OIL) 1000 MG capsule capsule, Take 1 capsule by mouth., Disp: , Rfl:   •  ondansetron (ZOFRAN) 4 MG tablet, Take 1 tablet by mouth Every 6 (Six) Hours As Needed for Nausea. for nausea, Disp: 90 tablet, Rfl: 1  •  pantoprazole (PROTONIX) 40 MG EC tablet, Take 1 tablet by mouth  Daily., Disp: 30 tablet, Rfl: 5  •  SITagliptin-MetFORMIN HCl ER (JANUMET XR) 100-1000 MG tablet, Take 1 tablet by mouth Daily., Disp: 90 tablet, Rfl: 3  •  vitamin B-12 (CYANOCOBALAMIN) 1000 MCG tablet, Take  by mouth daily., Disp: , Rfl:   •  gabapentin (NEURONTIN) 600 MG tablet, Take 1 tablet by mouth 3 (Three) Times a Day., Disp: 270 tablet, Rfl: 3  PRN Meds:.  Allergies   Allergen Reactions   • Sulfa Antibiotics Rash   • Hydromorphone      Affects his chron's disease doesn't want this drug   • Hydromorphone Hcl      Affects his chron's disease doesn't want this drug. Makes him sick and does not take away pain   • Morphine And Related Nausea And Vomiting     Has problems with his chron's  Has problems with his chron's. Makes him sick and does not take away pain; makes crohn's worse   • Penicillins Rash     UNKNOWN-CHILDHOOD     Social History     Socioeconomic History   • Marital status:      Spouse name: Not on file   • Number of children: Not on file   • Years of education: Not on file   • Highest education level: Not on file   Tobacco Use   • Smoking status: Never Smoker   • Smokeless tobacco: Never Used   Substance and Sexual Activity   • Alcohol use: Yes     Comment: RARE   • Drug use: No     Family History   Problem Relation Age of Onset   • Heart disease Mother    • Heart attack Mother    • Diabetes Mother    • Gout Father      Review of Systems   Constitutional: Negative for appetite change and unexpected weight change.   HENT: Negative for trouble swallowing.    Gastrointestinal: Negative for abdominal pain, blood in stool, diarrhea and nausea.   All other systems reviewed and are negative.    Vitals:    10/24/19 0922   BP: 146/88   Temp: 97.9 °F (36.6 °C)         10/24/19  0922   Weight: 135 kg (297 lb 6.4 oz)       Objective   Physical Exam   Constitutional: He appears well-developed and well-nourished.   HENT:   Head: Normocephalic and atraumatic.   Eyes: No scleral icterus.   Pulmonary/Chest:  Effort normal.   Abdominal: Soft.   obese   Musculoskeletal: He exhibits no edema.   Neurological: He is alert.   Skin: Skin is warm and dry.   Psychiatric: He has a normal mood and affect.     No images are attached to the encounter.    Assessment/Plan   Diagnoses and all orders for this visit:    Crohn's disease of small intestine with intestinal obstruction (CMS/HCC)  -     Case Request; Standing  -     Case Request    Gastroesophageal reflux disease, esophagitis presence not specified  -     pantoprazole (PROTONIX) 40 MG EC tablet; Take 1 tablet by mouth Daily.  -     Case Request; Standing  -     Case Request    Other orders  -     Follow Anesthesia Guidelines / Standing Orders; Future  -     Obtain Informed Consent; Future  -     Implement Anesthesia orders day of procedure.; Standing  -     Obtain informed consent; Standing  -     Verify bowel prep was successful; Standing  -     Give tap water enema if bowel prep was insufficient; Standing      Plan:  · Plan for repeat EGD and colonoscopy  · Discussed treatment regimens for Crohn's disease.  Recommend consideration of Entyvio versus Stelara.  He will research these medications and get back to me with questions and/or his decision  · Seems to be up-to-date on vaccinations-I have asked him to confirm this with his primary care physician and to receive his flu vaccine  · Continue pantoprazole nightly for acid reflux.  Discussed GERD diet modification and advised him to take pantoprazole prior to dinner  · Low residue diet  · Recent labs reviewed

## 2019-11-04 RX ORDER — LISINOPRIL AND HYDROCHLOROTHIAZIDE 20; 12.5 MG/1; MG/1
TABLET ORAL
Qty: 90 TABLET | Refills: 1 | Status: SHIPPED | OUTPATIENT
Start: 2019-11-04 | End: 2020-07-06 | Stop reason: SDUPTHER

## 2019-11-26 ENCOUNTER — OFFICE VISIT (OUTPATIENT)
Dept: FAMILY MEDICINE CLINIC | Facility: CLINIC | Age: 59
End: 2019-11-26

## 2019-11-26 VITALS
OXYGEN SATURATION: 96 % | BODY MASS INDEX: 39.36 KG/M2 | HEART RATE: 61 BPM | DIASTOLIC BLOOD PRESSURE: 76 MMHG | WEIGHT: 297 LBS | SYSTOLIC BLOOD PRESSURE: 136 MMHG | HEIGHT: 73 IN

## 2019-11-26 DIAGNOSIS — I10 BENIGN ESSENTIAL HYPERTENSION: ICD-10-CM

## 2019-11-26 DIAGNOSIS — E78.49 OTHER HYPERLIPIDEMIA: ICD-10-CM

## 2019-11-26 DIAGNOSIS — D84.9 IMMUNOSUPPRESSED STATUS (HCC): ICD-10-CM

## 2019-11-26 DIAGNOSIS — K21.9 GASTROESOPHAGEAL REFLUX DISEASE WITHOUT ESOPHAGITIS: ICD-10-CM

## 2019-11-26 DIAGNOSIS — E11.9 TYPE 2 DIABETES MELLITUS WITHOUT COMPLICATION, WITHOUT LONG-TERM CURRENT USE OF INSULIN (HCC): Primary | ICD-10-CM

## 2019-11-26 DIAGNOSIS — Z23 IMMUNIZATION DUE: ICD-10-CM

## 2019-11-26 DIAGNOSIS — K50.00 CROHN'S DISEASE OF SMALL INTESTINE WITHOUT COMPLICATION (HCC): ICD-10-CM

## 2019-11-26 DIAGNOSIS — N40.0 BENIGN PROSTATIC HYPERPLASIA WITHOUT URINARY OBSTRUCTION: ICD-10-CM

## 2019-11-26 DIAGNOSIS — Z79.899 DRUG THERAPY: ICD-10-CM

## 2019-11-26 DIAGNOSIS — Z23 FLU VACCINE NEED: ICD-10-CM

## 2019-11-26 LAB
POC CREATININE URINE: 100
POC MICROALBUMIN URINE: 30

## 2019-11-26 PROCEDURE — 90472 IMMUNIZATION ADMIN EACH ADD: CPT | Performed by: FAMILY MEDICINE

## 2019-11-26 PROCEDURE — 82044 UR ALBUMIN SEMIQUANTITATIVE: CPT | Performed by: FAMILY MEDICINE

## 2019-11-26 PROCEDURE — 90471 IMMUNIZATION ADMIN: CPT | Performed by: FAMILY MEDICINE

## 2019-11-26 PROCEDURE — 99214 OFFICE O/P EST MOD 30 MIN: CPT | Performed by: FAMILY MEDICINE

## 2019-11-26 PROCEDURE — 90674 CCIIV4 VAC NO PRSV 0.5 ML IM: CPT | Performed by: FAMILY MEDICINE

## 2019-11-26 PROCEDURE — 90670 PCV13 VACCINE IM: CPT | Performed by: FAMILY MEDICINE

## 2019-11-26 RX ORDER — FAMOTIDINE 20 MG/1
20 TABLET, FILM COATED ORAL NIGHTLY PRN
Qty: 90 TABLET | Refills: 1 | Status: SHIPPED | OUTPATIENT
Start: 2019-11-26 | End: 2020-04-20

## 2019-11-26 NOTE — PROGRESS NOTES
Subjective   Ramior Tovar JR is a 59 y.o. male. Presents today for   Chief Complaint   Patient presents with   • Diabetes   • Hyperlipidemia   • Hypertension       Diabetes   He presents for his follow-up diabetic visit. He has type 2 diabetes mellitus. His disease course has been stable. Pertinent negatives for diabetes include no chest pain, no foot paresthesias and no foot ulcerations. Symptoms are stable. Pertinent negatives for diabetic complications include no CVA or PVD. Risk factors for coronary artery disease include dyslipidemia, diabetes mellitus, hypertension, male sex and obesity. He is compliant with treatment all of the time. He is following a diabetic diet. An ACE inhibitor/angiotensin II receptor blocker is being taken.   Hyperlipidemia   This is a chronic problem. The current episode started more than 1 year ago. The problem is controlled. Recent lipid tests were reviewed and are normal. Exacerbating diseases include diabetes and obesity. He has no history of chronic renal disease. Factors aggravating his hyperlipidemia include thiazides. Pertinent negatives include no chest pain or shortness of breath. Current antihyperlipidemic treatment includes statins. The current treatment provides moderate improvement of lipids. Risk factors for coronary artery disease include diabetes mellitus, dyslipidemia, hypertension, male sex and obesity.   Hypertension   This is a chronic problem. The current episode started more than 1 year ago. The problem is unchanged. The problem is controlled. Pertinent negatives include no chest pain, orthopnea, palpitations, peripheral edema, PND or shortness of breath. There are no associated agents to hypertension. Risk factors for coronary artery disease include male gender, obesity, dyslipidemia and diabetes mellitus. The current treatment provides moderate improvement. There is no history of kidney disease, CAD/MI, CVA, heart failure or PVD. There is no history of  chronic renal disease.   Crohns  Patient changed GI specialists;  Has endoscopy scheduled 12/10/19.  To go on immunosuppression and so needs quantiferon gold;  Also needs immunizations, no live viruses.    Reports am cough, some nocturnal wheezing;  Heartburn much improved on pantoprazole;    Review of Systems   Respiratory: Negative for shortness of breath.    Cardiovascular: Negative for chest pain, palpitations, orthopnea and PND.       Patient Active Problem List   Diagnosis   • Hyperuricemia   • Benign essential hypertension   • Benign prostatic hypertrophy without urinary obstruction   • Chronic pain syndrome   • Crohn's disease of colon (CMS/HCC)   • Dyslipidemia   • Gout   • Weakness of hand   • Knee pain   • Low back pain   • Peripheral neuropathy   • Type 2 diabetes mellitus without complication, without long-term current use of insulin (CMS/HCC)   • Vitamin D deficiency   • Crohn's disease (CMS/HCC)   • History of colectomy   • Hyperlipidemia   • Calculus of kidney   • Adult body mass index greater than 30   • Small bowel obstruction (CMS/HCC)   • Gastroesophageal reflux disease   • Crohn's disease of small intestine with intestinal obstruction (CMS/HCC)       Social History     Socioeconomic History   • Marital status:      Spouse name: Not on file   • Number of children: Not on file   • Years of education: Not on file   • Highest education level: Not on file   Tobacco Use   • Smoking status: Never Smoker   • Smokeless tobacco: Never Used   Substance and Sexual Activity   • Alcohol use: Yes     Comment: RARE   • Drug use: No       Allergies   Allergen Reactions   • Sulfa Antibiotics Rash   • Hydromorphone      Affects his chron's disease doesn't want this drug   • Hydromorphone Hcl      Affects his chron's disease doesn't want this drug. Makes him sick and does not take away pain   • Morphine And Related Nausea And Vomiting     Has problems with his chron's  Has problems with his chron's. Makes  him sick and does not take away pain; makes crohn's worse   • Penicillins Rash     UNKNOWN-CHILDHOOD       Current Outpatient Medications on File Prior to Visit   Medication Sig Dispense Refill   • allopurinol (ZYLOPRIM) 300 MG tablet Take 1 tablet by mouth Daily. 90 tablet 3   • aspirin 81 MG tablet Take  by mouth daily.     • atorvastatin (LIPITOR) 40 MG tablet TAKE 1 TABLET BY MOUTH ONCE DAILY 90 tablet 1   • colchicine 0.6 MG tablet Take 1 tablet by mouth Daily. 30 tablet 6   • dicyclomine (BENTYL) 10 MG capsule Take 1 capsule by mouth 4 (Four) Times a Day Before Meals & at Bedtime. 120 capsule 5   • dutasteride (AVODART) 0.5 MG capsule TAKE 1 CAPSULE BY MOUTH ONCE DAILY 90 capsule 1   • gabapentin (NEURONTIN) 600 MG tablet Take 1 tablet by mouth 3 (Three) Times a Day. 270 tablet 3   • HYDROcodone-acetaminophen (NORCO)  MG per tablet Take 1 tablet by mouth 3 (Three) Times a Day As Needed. for pain  0   • lisinopril-hydrochlorothiazide (PRINZIDE,ZESTORETIC) 20-12.5 MG per tablet TAKE 1 TABLET BY MOUTH ONCE DAILY 90 tablet 1   • mesalamine (PENTASA) 500 MG CR capsule TAKE 2 CAPSULES BY MOUTH ONCE DAILY 180 capsule 1   • Multiple Vitamin (MULTI VITAMIN DAILY PO) Take  by mouth daily.     • Omega-3 Fatty Acids (FISH OIL) 1000 MG capsule capsule Take 1 capsule by mouth.     • ondansetron (ZOFRAN) 4 MG tablet Take 1 tablet by mouth Every 6 (Six) Hours As Needed for Nausea. for nausea 90 tablet 1   • pantoprazole (PROTONIX) 40 MG EC tablet Take 1 tablet by mouth Daily. 30 tablet 5   • SITagliptin-MetFORMIN HCl ER (JANUMET XR) 100-1000 MG tablet Take 1 tablet by mouth Daily. 90 tablet 3   • vitamin B-12 (CYANOCOBALAMIN) 1000 MCG tablet Take  by mouth daily.     • [DISCONTINUED] allopurinol (ZYLOPRIM) 300 MG tablet TAKE ONE TABLET BY MOUTH TWICE DAILY 180 tablet 1     No current facility-administered medications on file prior to visit.        Objective   Vitals:    11/26/19 0745   BP: 136/76   BP Location: Right  "arm   Patient Position: Sitting   Cuff Size: Large Adult   Pulse: 61   SpO2: 96%   Weight: 135 kg (297 lb)   Height: 185.4 cm (73\")       Physical Exam   Constitutional: He appears well-developed and well-nourished.   HENT:   Head: Normocephalic and atraumatic.   Neck: Neck supple. No JVD present. No thyromegaly present.   Cardiovascular: Normal rate, regular rhythm and normal heart sounds. Exam reveals no gallop and no friction rub.   No murmur heard.  Pulmonary/Chest: Effort normal and breath sounds normal. No respiratory distress. He has no wheezes. He has no rales.   Abdominal: Soft. Bowel sounds are normal. He exhibits no distension. There is no tenderness. There is no rebound and no guarding.   Musculoskeletal: He exhibits no edema.    Ramiro had a diabetic foot exam performed (see scanned report) today.   During the foot exam he had a monofilament test performed.  Neurological: He is alert.   Skin: Skin is warm and dry.   Psychiatric: He has a normal mood and affect. His behavior is normal.   Nursing note and vitals reviewed.      MAL/Cr ordered and reviewed.    Assessment/Plan   Ramiro was seen today for diabetes, hyperlipidemia and hypertension.    Diagnoses and all orders for this visit:    Type 2 diabetes mellitus without complication, without long-term current use of insulin (CMS/[facility])  -     POCT microalbumin  -     Comprehensive Metabolic Panel  -     Hemoglobin A1c  -     Lipid Panel    Flu vaccine need  -     Flucelvax Quad=>4Years (PFS)    Other hyperlipidemia  -     Lipid Panel    Benign essential hypertension  -     Comprehensive Metabolic Panel    Benign prostatic hypertrophy without urinary obstruction  -     PSA DIAGNOSTIC    Crohn's disease of small intestine without complication (CMS/[facility])  -     CBC & Differential  -     QuantiFERON TB Gold    Immunosuppressed status (CMS/[facility])  -     QuantiFERON TB Gold    Drug therapy  -     QuantiFERON TB Gold    Immunization due  -     Pneumococcal " Conjugate Vaccine 13-Valent All      Recommend shingirix, ok start now and 2nd shot after start;  Needs prevnar 13 today and flu;    Due prostate cancer screening  -dm2 - continue medications, recheck labs  -HLD - continue statin, recheck lipids.  -hypertension - controlled, continue medications         -Follow up: 6 months and prn

## 2019-11-28 LAB
ALBUMIN SERPL-MCNC: 4 G/DL (ref 3.5–5.5)
ALBUMIN/GLOB SERPL: 1.7 {RATIO} (ref 1.2–2.2)
ALP SERPL-CCNC: 58 IU/L (ref 39–117)
ALT SERPL-CCNC: 70 IU/L (ref 0–44)
AST SERPL-CCNC: 58 IU/L (ref 0–40)
BASOPHILS # BLD AUTO: 0 X10E3/UL (ref 0–0.2)
BASOPHILS NFR BLD AUTO: 1 %
BILIRUB SERPL-MCNC: 0.4 MG/DL (ref 0–1.2)
BUN SERPL-MCNC: 17 MG/DL (ref 6–24)
BUN/CREAT SERPL: 15 (ref 9–20)
CALCIUM SERPL-MCNC: 9.6 MG/DL (ref 8.7–10.2)
CHLORIDE SERPL-SCNC: 100 MMOL/L (ref 96–106)
CHOLEST SERPL-MCNC: 115 MG/DL (ref 100–199)
CO2 SERPL-SCNC: 22 MMOL/L (ref 20–29)
CREAT SERPL-MCNC: 1.14 MG/DL (ref 0.76–1.27)
EOSINOPHIL # BLD AUTO: 0.2 X10E3/UL (ref 0–0.4)
EOSINOPHIL NFR BLD AUTO: 2 %
ERYTHROCYTE [DISTWIDTH] IN BLOOD BY AUTOMATED COUNT: 16.5 % (ref 12.3–15.4)
GLOBULIN SER CALC-MCNC: 2.3 G/DL (ref 1.5–4.5)
GLUCOSE SERPL-MCNC: 182 MG/DL (ref 65–99)
HBA1C MFR BLD: 7.5 % (ref 4.8–5.6)
HCT VFR BLD AUTO: 40.8 % (ref 37.5–51)
HDLC SERPL-MCNC: 24 MG/DL
HGB BLD-MCNC: 12.6 G/DL (ref 13–17.7)
IMM GRANULOCYTES # BLD AUTO: 0 X10E3/UL (ref 0–0.1)
IMM GRANULOCYTES NFR BLD AUTO: 0 %
LDLC SERPL CALC-MCNC: 47 MG/DL (ref 0–99)
LYMPHOCYTES # BLD AUTO: 0.9 X10E3/UL (ref 0.7–3.1)
LYMPHOCYTES NFR BLD AUTO: 11 %
MCH RBC QN AUTO: 26 PG (ref 26.6–33)
MCHC RBC AUTO-ENTMCNC: 30.9 G/DL (ref 31.5–35.7)
MCV RBC AUTO: 84 FL (ref 79–97)
MONOCYTES # BLD AUTO: 0.5 X10E3/UL (ref 0.1–0.9)
MONOCYTES NFR BLD AUTO: 6 %
NEUTROPHILS # BLD AUTO: 6.5 X10E3/UL (ref 1.4–7)
NEUTROPHILS NFR BLD AUTO: 80 %
PLATELET # BLD AUTO: 178 X10E3/UL (ref 150–450)
POTASSIUM SERPL-SCNC: 4.3 MMOL/L (ref 3.5–5.2)
PROT SERPL-MCNC: 6.3 G/DL (ref 6–8.5)
PSA SERPL-MCNC: 1 NG/ML (ref 0–4)
RBC # BLD AUTO: 4.84 X10E6/UL (ref 4.14–5.8)
SODIUM SERPL-SCNC: 140 MMOL/L (ref 134–144)
TRIGL SERPL-MCNC: 219 MG/DL (ref 0–149)
VLDLC SERPL CALC-MCNC: 44 MG/DL (ref 5–40)
WBC # BLD AUTO: 8.2 X10E3/UL (ref 3.4–10.8)

## 2019-11-30 LAB
GAMMA INTERFERON BACKGROUND BLD IA-ACNC: 0.01 IU/ML
M TB IFN-G BLD-IMP: NEGATIVE
M TB IFN-G CD4+ BCKGRND COR BLD-ACNC: 0.02 IU/ML
MITOGEN IGNF BLD-ACNC: 6.72 IU/ML
QUANTIFERON INCUBATION: NORMAL
QUANTIFERON TB2 AG VALUE: 0.01 IU/ML
SERVICE CMNT-IMP: NORMAL

## 2019-12-10 ENCOUNTER — ANESTHESIA (OUTPATIENT)
Dept: GASTROENTEROLOGY | Facility: HOSPITAL | Age: 59
End: 2019-12-10

## 2019-12-10 ENCOUNTER — ANESTHESIA EVENT (OUTPATIENT)
Dept: GASTROENTEROLOGY | Facility: HOSPITAL | Age: 59
End: 2019-12-10

## 2019-12-10 ENCOUNTER — HOSPITAL ENCOUNTER (OUTPATIENT)
Facility: HOSPITAL | Age: 59
Setting detail: HOSPITAL OUTPATIENT SURGERY
Discharge: HOME | End: 2019-12-10
Attending: INTERNAL MEDICINE | Admitting: INTERNAL MEDICINE

## 2019-12-10 VITALS
TEMPERATURE: 98.2 F | HEART RATE: 81 BPM | OXYGEN SATURATION: 97 % | HEIGHT: 73 IN | DIASTOLIC BLOOD PRESSURE: 64 MMHG | WEIGHT: 286.1 LBS | BODY MASS INDEX: 37.92 KG/M2 | RESPIRATION RATE: 20 BRPM | SYSTOLIC BLOOD PRESSURE: 121 MMHG

## 2019-12-10 DIAGNOSIS — K21.9 GASTROESOPHAGEAL REFLUX DISEASE, ESOPHAGITIS PRESENCE NOT SPECIFIED: ICD-10-CM

## 2019-12-10 DIAGNOSIS — K50.012 CROHN'S DISEASE OF SMALL INTESTINE WITH INTESTINAL OBSTRUCTION (HCC): ICD-10-CM

## 2019-12-10 LAB — GLUCOSE BLDC GLUCOMTR-MCNC: 138 MG/DL (ref 70–130)

## 2019-12-10 PROCEDURE — 43239 EGD BIOPSY SINGLE/MULTIPLE: CPT | Performed by: INTERNAL MEDICINE

## 2019-12-10 PROCEDURE — 45385 COLONOSCOPY W/LESION REMOVAL: CPT | Performed by: INTERNAL MEDICINE

## 2019-12-10 PROCEDURE — S0260 H&P FOR SURGERY: HCPCS | Performed by: INTERNAL MEDICINE

## 2019-12-10 PROCEDURE — 25010000002 PROPOFOL 10 MG/ML EMULSION: Performed by: ANESTHESIOLOGY

## 2019-12-10 PROCEDURE — 45380 COLONOSCOPY AND BIOPSY: CPT | Performed by: INTERNAL MEDICINE

## 2019-12-10 PROCEDURE — 88305 TISSUE EXAM BY PATHOLOGIST: CPT | Performed by: INTERNAL MEDICINE

## 2019-12-10 PROCEDURE — 82962 GLUCOSE BLOOD TEST: CPT

## 2019-12-10 RX ORDER — LIDOCAINE HYDROCHLORIDE 20 MG/ML
INJECTION, SOLUTION INFILTRATION; PERINEURAL AS NEEDED
Status: DISCONTINUED | OUTPATIENT
Start: 2019-12-10 | End: 2019-12-10 | Stop reason: SURG

## 2019-12-10 RX ORDER — PROPOFOL 10 MG/ML
VIAL (ML) INTRAVENOUS CONTINUOUS PRN
Status: DISCONTINUED | OUTPATIENT
Start: 2019-12-10 | End: 2019-12-10 | Stop reason: SURG

## 2019-12-10 RX ORDER — PROPOFOL 10 MG/ML
VIAL (ML) INTRAVENOUS AS NEEDED
Status: DISCONTINUED | OUTPATIENT
Start: 2019-12-10 | End: 2019-12-10 | Stop reason: SURG

## 2019-12-10 RX ORDER — SUCRALFATE ORAL 1 G/10ML
1 SUSPENSION ORAL 4 TIMES DAILY
Qty: 560 ML | Refills: 0 | Status: SHIPPED | OUTPATIENT
Start: 2019-12-10 | End: 2019-12-25

## 2019-12-10 RX ORDER — SODIUM CHLORIDE, SODIUM LACTATE, POTASSIUM CHLORIDE, CALCIUM CHLORIDE 600; 310; 30; 20 MG/100ML; MG/100ML; MG/100ML; MG/100ML
1000 INJECTION, SOLUTION INTRAVENOUS CONTINUOUS
Status: DISCONTINUED | OUTPATIENT
Start: 2019-12-10 | End: 2019-12-10 | Stop reason: HOSPADM

## 2019-12-10 RX ADMIN — SODIUM CHLORIDE, POTASSIUM CHLORIDE, SODIUM LACTATE AND CALCIUM CHLORIDE 1000 ML: 600; 310; 30; 20 INJECTION, SOLUTION INTRAVENOUS at 10:03

## 2019-12-10 RX ADMIN — PROPOFOL 200 MCG/KG/MIN: 10 INJECTION, EMULSION INTRAVENOUS at 10:34

## 2019-12-10 RX ADMIN — PROPOFOL 150 MG: 10 INJECTION, EMULSION INTRAVENOUS at 10:34

## 2019-12-10 RX ADMIN — LIDOCAINE HYDROCHLORIDE 60 MG: 20 INJECTION, SOLUTION INFILTRATION; PERINEURAL at 10:34

## 2019-12-10 NOTE — H&P
[facility]  Pre Procedure History & Physical    Chief Complaint:   Gerd, crohn's    Subjective     HPI:   Ramiro Tovar JR is a  59 y.o. male here for a follow up visit for Crohn's disease.      She has a history of Crohn's disease of the small intestine with previous small bowel obstructions.  He last had a CT enterography in June 2019 that showed a thickened segment of small intestine without obstruction.  Prior to this study he had had a small bowel obstruction that was thought to be secondary to adhesions also in June 2019.  Has a history of prior stricturoplasty performed by Dr. Alonso Stanford.     Patient was previously followed by Dr. Sandro Sotomayor.  He has been treated with Pentasa in the past.     Tried remicade in the past but caused bone pain.  Tried humira in the past - put him in remission but symptoms recurred.     NO diarrhea or abdominal pain except with flares.  He has intermittent bouts of nausea and vomiting that are intractable and associated with abdominal pain.  He will use Bentyl and pain medication and try to sleep it off at times.  He says that 60 to 70% of the time he can avoid hospitalization with these means.  However he has had multiple small bowel obstructions requiring admission.     He reports worsening heartburn of late.  His pantoprazole was not approved for daily use after the first 90 days and so he was only taking it as needed.  He complains of issues at night.  He was taking pantoprazole after dinner.  No issues with swallowing.     He denies any arthralgias except wear and tear type arthritis pain.  No recent fevers chills or infections.  No rash or eye pain.  He is up-to-date on vaccinations with the exception of his flu vaccine.    He had a flare last week - significant n/v/abd pain - did not go to the ER.    Past Medical History:   Past Medical History:   Diagnosis Date   • Aspirin long-term use    • Asymptomatic hyperuricemia    • BPH (benign  prostatic hyperplasia)    • Chronic pain syndrome    • Crohn's colitis (CMS/HCC)    • Diabetes (CMS/HCC)    • Drug therapy    • Dyslipidemia    • Gout    • Hand weakness    • Herpes zoster    • Hypertension    • Knee pain    • Low back pain    • Obesity due to excess calories    • Peripheral neuropathy    • Personal history of gout    • Type 2 diabetes mellitus (CMS/HCC)    • Vitamin D deficiency        Past Surgical History:  Past Surgical History:   Procedure Laterality Date   • CATARACT EXTRACTION     • CHOLECYSTECTOMY     • COLONOSCOPY  2014   • INTESTINAL BYPASS      intestinal stricturoplasty   • UPPER GASTROINTESTINAL ENDOSCOPY  2014       Family History:  Family History   Problem Relation Age of Onset   • Heart disease Mother    • Heart attack Mother    • Diabetes Mother    • Gout Father        Social History:   reports that he has never smoked. He has never used smokeless tobacco. He reports that he drinks alcohol. He reports that he does not use drugs.    Medications:   Medications Prior to Admission   Medication Sig Dispense Refill Last Dose   • allopurinol (ZYLOPRIM) 300 MG tablet Take 1 tablet by mouth Daily. 90 tablet 3 12/9/2019 at Unknown time   • atorvastatin (LIPITOR) 40 MG tablet TAKE 1 TABLET BY MOUTH ONCE DAILY 90 tablet 1 12/9/2019 at Unknown time   • colchicine 0.6 MG tablet Take 1 tablet by mouth Daily. 30 tablet 6 12/9/2019 at Unknown time   • dutasteride (AVODART) 0.5 MG capsule TAKE 1 CAPSULE BY MOUTH ONCE DAILY 90 capsule 1 12/9/2019 at Unknown time   • HYDROcodone-acetaminophen (NORCO)  MG per tablet Take 1 tablet by mouth 3 (Three) Times a Day As Needed. for pain  0 12/9/2019 at Unknown time   • lisinopril-hydrochlorothiazide (PRINZIDE,ZESTORETIC) 20-12.5 MG per tablet TAKE 1 TABLET BY MOUTH ONCE DAILY 90 tablet 1 12/9/2019 at Unknown time   • mesalamine (PENTASA) 500 MG CR capsule TAKE 2 CAPSULES BY MOUTH ONCE DAILY 180 capsule 1 12/9/2019 at Unknown time   • pantoprazole  "(PROTONIX) 40 MG EC tablet Take 1 tablet by mouth Daily. 30 tablet 5 12/9/2019 at Unknown time   • SITagliptin-MetFORMIN HCl ER (JANUMET XR) 100-1000 MG tablet Take 1 tablet by mouth Daily. 90 tablet 3 12/9/2019 at Unknown time   • aspirin 81 MG tablet Take  by mouth daily.   12/3/2019   • dicyclomine (BENTYL) 10 MG capsule Take 1 capsule by mouth 4 (Four) Times a Day Before Meals & at Bedtime. 120 capsule 5 12/7/2019   • famotidine (PEPCID) 20 MG tablet Take 1 tablet by mouth At Night As Needed for Heartburn. 90 tablet 1 12/7/2019   • gabapentin (NEURONTIN) 600 MG tablet Take 1 tablet by mouth 3 (Three) Times a Day. 270 tablet 3 11/26/2019   • Multiple Vitamin (MULTI VITAMIN DAILY PO) Take  by mouth daily.   12/3/2019   • Omega-3 Fatty Acids (FISH OIL) 1000 MG capsule capsule Take 1 capsule by mouth.   12/3/2019   • ondansetron (ZOFRAN) 4 MG tablet Take 1 tablet by mouth Every 6 (Six) Hours As Needed for Nausea. for nausea 90 tablet 1 12/7/2019   • vitamin B-12 (CYANOCOBALAMIN) 1000 MCG tablet Take  by mouth daily.   12/3/2019       Allergies:  Sulfa antibiotics; Hydromorphone; Hydromorphone hcl; Morphine and related; and Penicillins    ROS:    Pertinent items are noted in HPI, all other systems reviewed and negative     Objective     Blood pressure 173/89, pulse 66, temperature 98.2 °F (36.8 °C), temperature source Oral, resp. rate 14, height 185.4 cm (73\"), weight 130 kg (286 lb 1.6 oz), SpO2 94 %.    Physical Exam   Constitutional: Pt is oriented to person, place, and time and well-developed, well-nourished, and in no distress.   Mouth/Throat: Oropharynx is clear and moist.   Neck: Normal range of motion.   Cardiovascular: Normal rate, regular rhythm     Pulmonary/Chest: Effort normal    Abdominal: Soft. Nontender  Skin: Skin is warm and dry.   Psychiatric: Mood, memory, affect and judgment normal.     Assessment/Plan     Diagnosis:  Gerd, crohn 's    Anticipated Surgical Procedure:  egd/colonoscopy    The " risks, benefits, and alternatives of this procedure have been discussed with the patient or the responsible party- the patient understands and agrees to proceed.

## 2019-12-10 NOTE — ANESTHESIA POSTPROCEDURE EVALUATION
Patient: Ramiro Tovar JR    Procedure Summary     Date:  12/10/19 Room / Location:  [facility] ENDOSCOPY 10 /  [facility]    Anesthesia Start:  1028 Anesthesia Stop:  1113    Procedures:       COLONOSCOPY TO CECUM AND TI WITH COLD BIOPSIES AND COLD AND HOT SNARE POLYPECTOMIES (N/A )      ESOPHAGOGASTRODUODENOSCOPY WITH COLD BIOPSIES (N/A Esophagus) Diagnosis:       Gastroesophageal reflux disease, esophagitis presence not specified      Crohn's disease of small intestine with intestinal obstruction (CMS/HCC)      (Gastroesophageal reflux disease, esophagitis presence not specified [K21.9])      (Crohn's disease of small intestine with intestinal obstruction (CMS/HCC) [K50.012])    Surgeon:  Mayra Calle MD Provider:  Bernard Hicks MD    Anesthesia Type:  MAC ASA Status:  3          Anesthesia Type: MAC    Vitals  No vitals data found for the desired time range.          Post Anesthesia Care and Evaluation    Patient location during evaluation: PHASE II  Patient participation: complete - patient participated  Level of consciousness: awake and alert  Pain management: adequate  Airway patency: patent  Anesthetic complications: No anesthetic complications  PONV Status: none  Cardiovascular status: acceptable  Respiratory status: acceptable  Hydration status: acceptable

## 2019-12-10 NOTE — ANESTHESIA PREPROCEDURE EVALUATION
Anesthesia Evaluation     Patient summary reviewed and Nursing notes reviewed                Airway   Mallampati: III  TM distance: <3 FB  Neck ROM: full  Possible difficult intubation  Dental - normal exam     Pulmonary - normal exam   Cardiovascular - normal exam    (+) hypertension, hyperlipidemia,       Neuro/Psych    ROS Comment: Peripheral neuropathy  GI/Hepatic/Renal/Endo    (+) obesity, morbid obesity, GERD,  renal disease stones, diabetes mellitus type 2,     ROS Comment: Hx Crohn's    Musculoskeletal     (+) back pain, chronic pain,   Abdominal   (+) obese,    Substance History      OB/GYN          Other          Other Comment: Hx gout                  Anesthesia Plan    ASA 3     MAC     intravenous induction     Anesthetic plan, all risks, benefits, and alternatives have been provided, discussed and informed consent has been obtained with: patient.

## 2019-12-11 LAB
CYTO UR: NORMAL
LAB AP CASE REPORT: NORMAL
PATH REPORT.FINAL DX SPEC: NORMAL
PATH REPORT.GROSS SPEC: NORMAL

## 2019-12-12 ENCOUNTER — TELEPHONE (OUTPATIENT)
Dept: GASTROENTEROLOGY | Facility: CLINIC | Age: 59
End: 2019-12-12

## 2019-12-12 NOTE — TELEPHONE ENCOUNTER
The polyp(s) biopsies showed adenomatous change. This is not cancerous but is considered potentially precancerous. Follow-up colonoscopy in 5 years is advised.     No active inflammation seen in the colon.  Mild inflammation seen in the stomach.  New current medications.      Please confirm with patient which medication he wished to proceed with - entyvio vs stelara  Will need to start pa

## 2019-12-13 ENCOUNTER — PRIOR AUTHORIZATION (OUTPATIENT)
Dept: GASTROENTEROLOGY | Facility: CLINIC | Age: 59
End: 2019-12-13

## 2019-12-13 NOTE — TELEPHONE ENCOUNTER
Call to pt.  Advise per Dr Calle that polyp(s) bx showed adenomatous change.  This is not cancerous, but is considered potentially precancerous.   F/u c/s in 5 yrs is advised     No active inflammation seen in the colon.  Mild inflammation seen in stomach. New current meds.    Verb understanding  States wants to proceed with stelara.  - message to DR Calle and Gemini SKELTON

## 2019-12-25 ENCOUNTER — HOSPITAL ENCOUNTER (EMERGENCY)
Facility: HOSPITAL | Age: 59
Discharge: HOME | End: 2019-12-25
Attending: EMERGENCY MEDICINE | Admitting: EMERGENCY MEDICINE

## 2019-12-25 ENCOUNTER — APPOINTMENT (OUTPATIENT)
Dept: CT IMAGING | Facility: HOSPITAL | Age: 59
End: 2019-12-25

## 2019-12-25 VITALS
HEIGHT: 73 IN | BODY MASS INDEX: 38.7 KG/M2 | SYSTOLIC BLOOD PRESSURE: 170 MMHG | TEMPERATURE: 98.4 F | RESPIRATION RATE: 16 BRPM | HEART RATE: 89 BPM | OXYGEN SATURATION: 93 % | WEIGHT: 292 LBS | DIASTOLIC BLOOD PRESSURE: 94 MMHG

## 2019-12-25 DIAGNOSIS — R10.84 GENERALIZED ABDOMINAL PAIN: Primary | ICD-10-CM

## 2019-12-25 LAB
ALBUMIN SERPL-MCNC: 3.9 G/DL (ref 3.5–5.2)
ALBUMIN/GLOB SERPL: 1.3 G/DL
ALP SERPL-CCNC: 58 U/L (ref 39–117)
ALT SERPL W P-5'-P-CCNC: 79 U/L (ref 1–41)
ANION GAP SERPL CALCULATED.3IONS-SCNC: 10.8 MMOL/L (ref 5–15)
AST SERPL-CCNC: 61 U/L (ref 1–40)
BASOPHILS # BLD AUTO: 0.04 10*3/MM3 (ref 0–0.2)
BASOPHILS NFR BLD AUTO: 0.4 % (ref 0–1.5)
BILIRUB SERPL-MCNC: 0.5 MG/DL (ref 0.2–1.2)
BILIRUB UR QL STRIP: NEGATIVE
BUN BLD-MCNC: 14 MG/DL (ref 6–20)
BUN/CREAT SERPL: 13.9 (ref 7–25)
CALCIUM SPEC-SCNC: 9.2 MG/DL (ref 8.6–10.5)
CHLORIDE SERPL-SCNC: 103 MMOL/L (ref 98–107)
CLARITY UR: CLEAR
CO2 SERPL-SCNC: 29.2 MMOL/L (ref 22–29)
COLOR UR: YELLOW
CREAT BLD-MCNC: 1.01 MG/DL (ref 0.76–1.27)
DEPRECATED RDW RBC AUTO: 46.1 FL (ref 37–54)
EOSINOPHIL # BLD AUTO: 0.11 10*3/MM3 (ref 0–0.4)
EOSINOPHIL NFR BLD AUTO: 1 % (ref 0.3–6.2)
ERYTHROCYTE [DISTWIDTH] IN BLOOD BY AUTOMATED COUNT: 15.5 % (ref 12.3–15.4)
GFR SERPL CREATININE-BSD FRML MDRD: 76 ML/MIN/1.73
GLOBULIN UR ELPH-MCNC: 3 GM/DL
GLUCOSE BLD-MCNC: 150 MG/DL (ref 65–99)
GLUCOSE UR STRIP-MCNC: NEGATIVE MG/DL
HCT VFR BLD AUTO: 41.8 % (ref 37.5–51)
HGB BLD-MCNC: 13.6 G/DL (ref 13–17.7)
HGB UR QL STRIP.AUTO: NEGATIVE
HOLD SPECIMEN: NORMAL
HOLD SPECIMEN: NORMAL
IMM GRANULOCYTES # BLD AUTO: 0.04 10*3/MM3 (ref 0–0.05)
IMM GRANULOCYTES NFR BLD AUTO: 0.4 % (ref 0–0.5)
KETONES UR QL STRIP: NEGATIVE
LEUKOCYTE ESTERASE UR QL STRIP.AUTO: NEGATIVE
LIPASE SERPL-CCNC: 41 U/L (ref 13–60)
LYMPHOCYTES # BLD AUTO: 0.76 10*3/MM3 (ref 0.7–3.1)
LYMPHOCYTES NFR BLD AUTO: 7.1 % (ref 19.6–45.3)
MCH RBC QN AUTO: 26.9 PG (ref 26.6–33)
MCHC RBC AUTO-ENTMCNC: 32.5 G/DL (ref 31.5–35.7)
MCV RBC AUTO: 82.8 FL (ref 79–97)
MONOCYTES # BLD AUTO: 0.51 10*3/MM3 (ref 0.1–0.9)
MONOCYTES NFR BLD AUTO: 4.8 % (ref 5–12)
NEUTROPHILS # BLD AUTO: 9.26 10*3/MM3 (ref 1.7–7)
NEUTROPHILS NFR BLD AUTO: 86.3 % (ref 42.7–76)
NITRITE UR QL STRIP: NEGATIVE
NRBC BLD AUTO-RTO: 0 /100 WBC (ref 0–0.2)
PH UR STRIP.AUTO: 5.5 [PH] (ref 5–8)
PLATELET # BLD AUTO: 201 10*3/MM3 (ref 140–450)
PMV BLD AUTO: 10.8 FL (ref 6–12)
POTASSIUM BLD-SCNC: 4.3 MMOL/L (ref 3.5–5.2)
PROT SERPL-MCNC: 6.9 G/DL (ref 6–8.5)
PROT UR QL STRIP: NEGATIVE
RBC # BLD AUTO: 5.05 10*6/MM3 (ref 4.14–5.8)
SODIUM BLD-SCNC: 143 MMOL/L (ref 136–145)
SP GR UR STRIP: 1.02 (ref 1–1.03)
UROBILINOGEN UR QL STRIP: NORMAL
WBC NRBC COR # BLD: 10.72 10*3/MM3 (ref 3.4–10.8)
WHOLE BLOOD HOLD SPECIMEN: NORMAL
WHOLE BLOOD HOLD SPECIMEN: NORMAL

## 2019-12-25 PROCEDURE — 81003 URINALYSIS AUTO W/O SCOPE: CPT | Performed by: EMERGENCY MEDICINE

## 2019-12-25 PROCEDURE — 96376 TX/PRO/DX INJ SAME DRUG ADON: CPT

## 2019-12-25 PROCEDURE — 85025 COMPLETE CBC W/AUTO DIFF WBC: CPT | Performed by: EMERGENCY MEDICINE

## 2019-12-25 PROCEDURE — 96374 THER/PROPH/DIAG INJ IV PUSH: CPT

## 2019-12-25 PROCEDURE — 83690 ASSAY OF LIPASE: CPT | Performed by: EMERGENCY MEDICINE

## 2019-12-25 PROCEDURE — 74177 CT ABD & PELVIS W/CONTRAST: CPT

## 2019-12-25 PROCEDURE — 25010000002 BUTORPHANOL PER 1 MG: Performed by: EMERGENCY MEDICINE

## 2019-12-25 PROCEDURE — 25010000002 ONDANSETRON PER 1 MG: Performed by: EMERGENCY MEDICINE

## 2019-12-25 PROCEDURE — 80053 COMPREHEN METABOLIC PANEL: CPT | Performed by: EMERGENCY MEDICINE

## 2019-12-25 PROCEDURE — 25010000002 PROMETHAZINE PER 50 MG: Performed by: EMERGENCY MEDICINE

## 2019-12-25 PROCEDURE — 25010000002 IOPAMIDOL 61 % SOLUTION: Performed by: EMERGENCY MEDICINE

## 2019-12-25 PROCEDURE — 99284 EMERGENCY DEPT VISIT MOD MDM: CPT

## 2019-12-25 PROCEDURE — 96375 TX/PRO/DX INJ NEW DRUG ADDON: CPT

## 2019-12-25 RX ORDER — ONDANSETRON 2 MG/ML
4 INJECTION INTRAMUSCULAR; INTRAVENOUS ONCE
Status: COMPLETED | OUTPATIENT
Start: 2019-12-25 | End: 2019-12-25

## 2019-12-25 RX ORDER — PROMETHAZINE HYDROCHLORIDE 25 MG/ML
12.5 INJECTION, SOLUTION INTRAMUSCULAR; INTRAVENOUS ONCE
Status: COMPLETED | OUTPATIENT
Start: 2019-12-25 | End: 2019-12-25

## 2019-12-25 RX ORDER — SODIUM CHLORIDE 0.9 % (FLUSH) 0.9 %
10 SYRINGE (ML) INJECTION AS NEEDED
Status: DISCONTINUED | OUTPATIENT
Start: 2019-12-25 | End: 2019-12-26 | Stop reason: HOSPADM

## 2019-12-25 RX ADMIN — BUTORPHANOL TARTRATE 2 MG: 2 INJECTION, SOLUTION INTRAMUSCULAR; INTRAVENOUS at 22:50

## 2019-12-25 RX ADMIN — PROMETHAZINE HYDROCHLORIDE 12.5 MG: 25 INJECTION INTRAMUSCULAR; INTRAVENOUS at 22:44

## 2019-12-25 RX ADMIN — IOPAMIDOL 85 ML: 612 INJECTION, SOLUTION INTRAVENOUS at 21:31

## 2019-12-25 RX ADMIN — SODIUM CHLORIDE 1000 ML: 9 INJECTION, SOLUTION INTRAVENOUS at 19:54

## 2019-12-25 RX ADMIN — BUTORPHANOL TARTRATE 1 MG: 2 INJECTION, SOLUTION INTRAMUSCULAR; INTRAVENOUS at 20:03

## 2019-12-25 RX ADMIN — ONDANSETRON 4 MG: 2 INJECTION INTRAMUSCULAR; INTRAVENOUS at 19:59

## 2019-12-25 NOTE — ED NOTES
PT C/O NAUSEA, SEVERE MID ABD PAIN, CHILLS THAT HAS BEEN GOING ON FOR A MONTH ALTHOUGH WORSE TONIGHT AFTER EATING. HX OF CHRON'S. TOOK ZOFRAN AND HYDROCODONE 1 HOUR AGO WITHOUT RELIEF.      Jacob Sue RN  12/25/19 1827

## 2019-12-26 NOTE — ED PROVIDER NOTES
EMERGENCY DEPARTMENT ENCOUNTER    Room Number:  32/32  Date of encounter:  12/26/2019  PCP: Berlin Mcgarry DO  Historian: Patient      HPI:  Chief Complaint: Abdominal pain and vomiting  A complete HPI/ROS/PMH/PSH/SH/FH are unobtainable due to: Nothing    Context: Ramiro Tovar JR is a 59 y.o. male who presents to the ED c/o severe, diffuse abdominal pain for the last week or so.  The pain comes and goes, gets worse after he eats, does not radiate but results in episodes of intractable vomiting and severe pain.  Patient had no fevers, no hematemesis and normal BMs.    Patient claims history of Crohn's disease and recently switched to Dr. Guy's service.  They are awaiting insurance approval for him to start on Stelara.  He has had 1 surgery on his Crohn's by Dr. Stanford at [facility] about 20 years ago, but otherwise has not required surgery.    He said that this episodic pain is been ongoing for the better part of the 20 years, but seems worse tonight.      PAST MEDICAL HISTORY  Active Ambulatory Problems     Diagnosis Date Noted   • Hyperuricemia 04/21/2016   • Benign essential hypertension 04/21/2016   • Benign prostatic hypertrophy without urinary obstruction 04/21/2016   • Chronic pain syndrome 04/21/2016   • Crohn's disease of colon (CMS/[facility]) 04/21/2016   • Dyslipidemia 04/21/2016   • Gout 04/21/2016   • Weakness of hand 04/21/2016   • Knee pain 04/21/2016   • Low back pain 04/21/2016   • Peripheral neuropathy 04/21/2016   • Type 2 diabetes mellitus without complication, without long-term current use of insulin (CMS/[facility]) 04/21/2016   • Vitamin D deficiency 04/21/2016   • Crohn's disease (CMS/[facility]) 10/27/2016   • History of colectomy 06/08/2015   • Hyperlipidemia 10/27/2016   • Calculus of kidney 09/26/2013   • Adult body mass index greater than 30 06/08/2015   • Small bowel obstruction (CMS/[facility]) 11/24/2014   • Gastroesophageal reflux disease 10/24/2019   • Crohn's disease of small intestine with  intestinal obstruction (CMS/HCC) 10/24/2019     Resolved Ambulatory Problems     Diagnosis Date Noted   • Uncontrolled type 2 diabetes mellitus (CMS/HCC) 04/21/2016   • Herpes zoster 04/21/2016   • Simple obesity 04/21/2016   • Hypertension 10/27/2016     Past Medical History:   Diagnosis Date   • Aspirin long-term use    • Asymptomatic hyperuricemia    • BPH (benign prostatic hyperplasia)    • Crohn's colitis (CMS/[facility])    • Diabetes (CMS/[facility])    • Drug therapy    • Hand weakness    • Obesity due to excess calories    • Personal history of gout    • Type 2 diabetes mellitus (CMS/HCC)          PAST SURGICAL HISTORY  Past Surgical History:   Procedure Laterality Date   • CATARACT EXTRACTION     • CHOLECYSTECTOMY     • COLONOSCOPY  2014   • COLONOSCOPY N/A 12/10/2019    Procedure: COLONOSCOPY TO CECUM AND TI WITH COLD BIOPSIES AND COLD AND HOT SNARE POLYPECTOMIES;  Surgeon: Mayra Calle MD;  Location: [facility] ENDOSCOPY;  Service: Gastroenterology   • ENDOSCOPY N/A 12/10/2019    Procedure: ESOPHAGOGASTRODUODENOSCOPY WITH COLD BIOPSIES;  Surgeon: Mayra Calle MD;  Location: [facility] ENDOSCOPY;  Service: Gastroenterology   • INTESTINAL BYPASS      intestinal stricturoplasty   • UPPER GASTROINTESTINAL ENDOSCOPY  2014         FAMILY HISTORY  Family History   Problem Relation Age of Onset   • Heart disease Mother    • Heart attack Mother    • Diabetes Mother    • Gout Father          SOCIAL HISTORY  Social History     Socioeconomic History   • Marital status:      Spouse name: Not on file   • Number of children: Not on file   • Years of education: Not on file   • Highest education level: Not on file   Tobacco Use   • Smoking status: Never Smoker   • Smokeless tobacco: Never Used   Substance and Sexual Activity   • Alcohol use: Yes     Comment: RARE   • Drug use: No   • Sexual activity: Defer         ALLERGIES  Sulfa antibiotics; Hydromorphone; Hydromorphone hcl; Morphine and related; and  Penicillins        REVIEW OF SYSTEMS  Review of Systems     All systems reviewed and negative except for those discussed in HPI.       PHYSICAL EXAM    I have reviewed the triage vital signs and nursing notes.    ED Triage Vitals   Temp Heart Rate Resp BP SpO2   12/25/19 1827 12/25/19 1827 12/25/19 1827 12/25/19 1928 12/25/19 1827   98.4 °F (36.9 °C) 96 16 167/98 97 %      Temp src Heart Rate Source Patient Position BP Location FiO2 (%)   -- -- -- -- --              Physical Exam  GENERAL: Distressed  HENT: nares patent  EYES: no scleral icterus  CV: regular rhythm, regular rate  RESPIRATORY: normal effort, CTA bilaterally  ABDOMEN: soft, mild diffuse tenderness palpation with no rebound or guarding, bowel sounds present throughout  MUSCULOSKELETAL: no deformity  NEURO: alert, moves all extremities, follows commands  SKIN: warm, dry        LAB RESULTS  Recent Results (from the past 24 hour(s))   Comprehensive Metabolic Panel    Collection Time: 12/25/19  7:49 PM   Result Value Ref Range    Glucose 150 (H) 65 - 99 mg/dL    BUN 14 6 - 20 mg/dL    Creatinine 1.01 0.76 - 1.27 mg/dL    Sodium 143 136 - 145 mmol/L    Potassium 4.3 3.5 - 5.2 mmol/L    Chloride 103 98 - 107 mmol/L    CO2 29.2 (H) 22.0 - 29.0 mmol/L    Calcium 9.2 8.6 - 10.5 mg/dL    Total Protein 6.9 6.0 - 8.5 g/dL    Albumin 3.90 3.50 - 5.20 g/dL    ALT (SGPT) 79 (H) 1 - 41 U/L    AST (SGOT) 61 (H) 1 - 40 U/L    Alkaline Phosphatase 58 39 - 117 U/L    Total Bilirubin 0.5 0.2 - 1.2 mg/dL    eGFR Non African Amer 76 >60 mL/min/1.73    Globulin 3.0 gm/dL    A/G Ratio 1.3 g/dL    BUN/Creatinine Ratio 13.9 7.0 - 25.0    Anion Gap 10.8 5.0 - 15.0 mmol/L   Lipase    Collection Time: 12/25/19  7:49 PM   Result Value Ref Range    Lipase 41 13 - 60 U/L   Light Blue Top    Collection Time: 12/25/19  7:49 PM   Result Value Ref Range    Extra Tube hold for add-on    Green Top (Gel)    Collection Time: 12/25/19  7:49 PM   Result Value Ref Range    Extra Tube Hold for  add-ons.    Lavender Top    Collection Time: 12/25/19  7:49 PM   Result Value Ref Range    Extra Tube hold for add-on    Gold Top - SST    Collection Time: 12/25/19  7:49 PM   Result Value Ref Range    Extra Tube Hold for add-ons.    CBC Auto Differential    Collection Time: 12/25/19  7:49 PM   Result Value Ref Range    WBC 10.72 3.40 - 10.80 10*3/mm3    RBC 5.05 4.14 - 5.80 10*6/mm3    Hemoglobin 13.6 13.0 - 17.7 g/dL    Hematocrit 41.8 37.5 - 51.0 %    MCV 82.8 79.0 - 97.0 fL    MCH 26.9 26.6 - 33.0 pg    MCHC 32.5 31.5 - 35.7 g/dL    RDW 15.5 (H) 12.3 - 15.4 %    RDW-SD 46.1 37.0 - 54.0 fl    MPV 10.8 6.0 - 12.0 fL    Platelets 201 140 - 450 10*3/mm3    Neutrophil % 86.3 (H) 42.7 - 76.0 %    Lymphocyte % 7.1 (L) 19.6 - 45.3 %    Monocyte % 4.8 (L) 5.0 - 12.0 %    Eosinophil % 1.0 0.3 - 6.2 %    Basophil % 0.4 0.0 - 1.5 %    Immature Grans % 0.4 0.0 - 0.5 %    Neutrophils, Absolute 9.26 (H) 1.70 - 7.00 10*3/mm3    Lymphocytes, Absolute 0.76 0.70 - 3.10 10*3/mm3    Monocytes, Absolute 0.51 0.10 - 0.90 10*3/mm3    Eosinophils, Absolute 0.11 0.00 - 0.40 10*3/mm3    Basophils, Absolute 0.04 0.00 - 0.20 10*3/mm3    Immature Grans, Absolute 0.04 0.00 - 0.05 10*3/mm3    nRBC 0.0 0.0 - 0.2 /100 WBC   Urinalysis With Microscopic If Indicated (No Culture) - Urine, Clean Catch    Collection Time: 12/25/19  9:20 PM   Result Value Ref Range    Color, UA Yellow Yellow, Straw    Appearance, UA Clear Clear    pH, UA 5.5 5.0 - 8.0    Specific Gravity, UA 1.022 1.005 - 1.030    Glucose, UA Negative Negative    Ketones, UA Negative Negative    Bilirubin, UA Negative Negative    Blood, UA Negative Negative    Protein, UA Negative Negative    Leuk Esterase, UA Negative Negative    Nitrite, UA Negative Negative    Urobilinogen, UA 0.2 E.U./dL 0.2 - 1.0 E.U./dL       Ordered the above labs and independently reviewed the results.        RADIOLOGY  Ct Abdomen Pelvis With Contrast    Result Date: 12/25/2019  CT SCANS ABDOMEN AND PELVIS  WITH IV CONTRAST.  HISTORY: abd pain / crohns  COMPARISON: None.  TECHNIQUE: Radiation dose reduction techniques were utilized, including automated exposure control and exposure modulation based on body size. Axial images were obtained from the lung bases to the symphysis pubis with IV contrast only.. Oral contrast was not administered per request.  FINDINGS :  Fatty liver. Prior cholecystectomy. There are nonobstructing renal calculi bilaterally. There are low-density renal lesions bilaterally felt to reflect cysts. Largest is on the right and measures 6.4 cm diameter. No enhancing renal mass or hydronephrosis. Remaining solid organs have an unremarkable appearance. Normal aorta and appendix. Mild diverticulosis. The GI tract not opacified for assessment but non obstructive in appearance.  There are 2 fat-containing ventral hernias near midline. The larger of the 2 on image 74 measures 4.8 cm diameter. There is a smaller periumbilical hernia on image 109. No associated inflammation.        IMPRESSION : 1. Mild fatty liver. 2. Bilateral nephrolithiasis, nonobstructing with low-density cortical lesions felt to reflect cysts. 3. Colonic diverticulosis. 3. Small ventral hernias as discussed   This report was finalized on 12/25/2019 11:32 PM by David Bethea M.D.        I ordered the above noted radiological studies. Reviewed by me and discussed with radiologist.  See dictation for official radiology interpretation.      PROCEDURES    Procedures      MEDICATIONS GIVEN IN ER    Medications   sodium chloride 0.9 % flush 10 mL (has no administration in time range)   butorphanol (STADOL) injection 1 mg (1 mg Intravenous Given 12/25/19 2003)   ondansetron (ZOFRAN) injection 4 mg (4 mg Intravenous Given 12/25/19 1959)   sodium chloride 0.9 % bolus 1,000 mL (0 mL Intravenous Stopped 12/25/19 2024)   iopamidol (ISOVUE-300) 61 % injection 100 mL (85 mL Intravenous Given by Other 12/25/19 2131)   butorphanol (STADOL) injection 2  mg (2 mg Intravenous Given 12/25/19 6242)   promethazine (PHENERGAN) injection 12.5 mg (12.5 mg Intravenous Given 12/25/19 4060)         PROGRESS, DATA ANALYSIS, CONSULTS, AND MEDICAL DECISION MAKING    All labs have been independently reviewed by me.  All radiology studies have been reviewed by me and discussed with radiologist dictating the report.   EKG's independently viewed and interpreted by me.  Discussion below represents my analysis of pertinent findings related to patient's condition, differential diagnosis, treatment plan and final disposition.        ED Course as of Dec 26 0148   Thu Dec 26, 2019   0147 CBC unremarkable, chemistry shows very mild elevation in his liver enzymes this is unchanged from a chemistry in [facility] from a month ago.  Urinalysis is negative    [DP]   0147 CT scan of the abdomen pelvis shows no acute disease.  There is no evidence for obstruction or inflammatory change.  There are nonincarcerated ventral hernias which are appreciated and stable in appearance.    [DP]   0148 Patient received 2 doses of IV Stadol for his pain, and I advised him at this point he will need to follow-up with GI and his surgeon.    [DP]      ED Course User Index  [DP] John Ornelas MD           AS OF 1:48 AM VITALS:    BP - 170/94  HR - 89  TEMP - 98.4 °F (36.9 °C)  O2 SATS - 93%        DIAGNOSIS  Final diagnoses:   Generalized abdominal pain         DISPOSITION  Discharge           John Ornelas MD  12/26/19 0148

## 2019-12-26 NOTE — ED NOTES
"Pt states he came in \"for my Chrohn's.\" C/o LLQ ABD pain/spasms and nausea since 1000 today that started after eating \"a jason sandwich.\" States had diarrhea earlier in the week that has since resolved. Denies vomiting. Denies  sx. States had a \"scope done 2 wks ago and they informed me I have diverticulitis too.\" States took Zofran, Pepcid, 3 hydrocodones, and an \"anti-spasm medicine\" with a small amount of relief \"but then it comes back.\" NAD, respirations even and unlabored.     Ophelia Morgan, RN  12/25/19 1932    "

## 2020-01-10 ENCOUNTER — TELEPHONE (OUTPATIENT)
Dept: GASTROENTEROLOGY | Facility: CLINIC | Age: 60
End: 2020-01-10

## 2020-01-10 NOTE — TELEPHONE ENCOUNTER
----- Message from Marian Booth sent at 1/10/2020 12:39 PM EST -----  Regarding: question  Contact: [phone]  Pt states last time he was here Dr Calle refer him to another Dr he doesn't know the name and is asking for it

## 2020-01-10 NOTE — TELEPHONE ENCOUNTER
Called pt and left vm for pt to call back.  Could not find name of md for referral . Message to Dr Calle regarding pt's message.

## 2020-01-16 NOTE — TELEPHONE ENCOUNTER
Some rheumatologist that I have worked with Include Guera Lewis, Karime Thorpe, Alyce Desai -I do not know which name I gave him last time.  I did not refer him to anyone-does he need a referral and if so please refresh my memory because I do not recall the reason for referral

## 2020-01-16 NOTE — TELEPHONE ENCOUNTER
VOICEMAIL   Received: Today   Message Contents   Pamela Moniquek [facility] Clinical 1 Pool   Phone Number: [phone]             WANTS TO KNOW NAME OF RHEUM BEING REFERRED TO.      Message to Dr Calle.  Linda Santamaria RN.

## 2020-01-20 ENCOUNTER — TELEPHONE (OUTPATIENT)
Dept: GASTROENTEROLOGY | Facility: CLINIC | Age: 60
End: 2020-01-20

## 2020-01-20 NOTE — TELEPHONE ENCOUNTER
Received call from pt advising that he has called the office multiple times regarding which rhumatology office he needs to call.      Called pt and had to leave  for pt.  On vm advised pt he can call [facility] at [phone]. Advised pt that if has any questions he can call us back at [phone].

## 2020-01-21 ENCOUNTER — TELEPHONE (OUTPATIENT)
Dept: GASTROENTEROLOGY | Facility: CLINIC | Age: 60
End: 2020-01-21

## 2020-01-21 NOTE — TELEPHONE ENCOUNTER
----- Message from Pamela Monique sent at 1/21/2020 12:42 PM EST -----  Regarding: Pt Call  Question about Stelara

## 2020-01-21 NOTE — TELEPHONE ENCOUNTER
VM to pt with request to contact office.    See PA request of 12/13 for Stelara.   Message to Gemini SKELTON

## 2020-01-23 NOTE — TELEPHONE ENCOUNTER
Gemini Christianson MA  [facility] Clinical 1 Pool 1 hour ago (9:02 AM)      Attempt to reach patient was not successful , L/M home & cell & wife #'s....Stelara infusion is being handled by GERRY Clark  [phone]   Mayra Moran  Asst Office Mgr  Did speak w/him 12//26/2019. He declined to be scheduled for infusion ,stated he was ill, took down contact info from Mayra to schedule at a later date...Mayra will reach out again to patient today     Above message noted.  Linda Santamaria RN.

## 2020-01-29 ENCOUNTER — TELEPHONE (OUTPATIENT)
Dept: GASTROENTEROLOGY | Facility: CLINIC | Age: 60
End: 2020-01-29

## 2020-01-29 NOTE — TELEPHONE ENCOUNTER
----- Message from Marian Booth sent at 1/29/2020  9:37 AM EST -----  Regarding: FW: Referal  Contact: [phone]      ----- Message -----  From: Marian Booth  Sent: 1/29/2020   9:34 AM EST  To: Julia Yeh [facility] 2 Pool  Subject: Referal                                          [facility] is calling needs to talk with a nurse regarding referral. Mayra at [phone]

## 2020-02-03 RX ORDER — ATORVASTATIN CALCIUM 40 MG/1
TABLET, FILM COATED ORAL
Qty: 90 TABLET | Refills: 0 | Status: SHIPPED | OUTPATIENT
Start: 2020-02-03 | End: 2020-04-21

## 2020-02-03 RX ORDER — DUTASTERIDE 0.5 MG/1
CAPSULE, LIQUID FILLED ORAL
Qty: 90 CAPSULE | Refills: 0 | Status: SHIPPED | OUTPATIENT
Start: 2020-02-03 | End: 2020-04-21

## 2020-02-03 RX ORDER — SITAGLIPTIN AND METFORMIN HYDROCHLORIDE 1000; 100 MG/1; MG/1
TABLET, FILM COATED, EXTENDED RELEASE ORAL
Qty: 90 TABLET | Refills: 0 | Status: SHIPPED | OUTPATIENT
Start: 2020-02-03 | End: 2020-10-05 | Stop reason: SDUPTHER

## 2020-02-03 RX ORDER — ONDANSETRON 4 MG/1
TABLET, FILM COATED ORAL
Qty: 36 TABLET | Refills: 0 | Status: SHIPPED | OUTPATIENT
Start: 2020-02-03 | End: 2020-04-21

## 2020-02-03 RX ORDER — MESALAMINE 500 MG/1
CAPSULE, EXTENDED RELEASE ORAL
Qty: 180 CAPSULE | Refills: 0 | Status: SHIPPED | OUTPATIENT
Start: 2020-02-03 | End: 2021-11-08

## 2020-02-05 ENCOUNTER — OFFICE VISIT (OUTPATIENT)
Dept: GASTROENTEROLOGY | Facility: CLINIC | Age: 60
End: 2020-02-05

## 2020-02-05 VITALS
BODY MASS INDEX: 39.12 KG/M2 | SYSTOLIC BLOOD PRESSURE: 170 MMHG | TEMPERATURE: 98.1 F | HEIGHT: 73 IN | WEIGHT: 295.2 LBS | DIASTOLIC BLOOD PRESSURE: 90 MMHG

## 2020-02-05 DIAGNOSIS — K50.012 CROHN'S DISEASE OF SMALL INTESTINE WITH INTESTINAL OBSTRUCTION (HCC): Primary | ICD-10-CM

## 2020-02-05 PROCEDURE — 99214 OFFICE O/P EST MOD 30 MIN: CPT | Performed by: INTERNAL MEDICINE

## 2020-02-05 RX ORDER — DICYCLOMINE HCL 20 MG
20 TABLET ORAL
Qty: 90 TABLET | Refills: 5 | Status: SHIPPED | OUTPATIENT
Start: 2020-02-05 | End: 2020-03-06

## 2020-02-05 NOTE — PROGRESS NOTES
Subjective   Chief Complaint   Patient presents with   • Crohn's Disease       Ramiro Tovar JR is a  60 y.o. male here for a follow up visit for Crohn's disease of the small intestine.    Was seen initially in the office in follow-up 2019.  Previously had been followed by Dr. Sandro Sotomayor.  He underwent a colonoscopy and EGD in December 2019.  EGD showed LA class a esophagitis and gastritis-he is on pantoprazole daily.  2 tubular adenomas removed from the colon.  Biopsies of the colon showed no active inflammation related to Crohn's.  Visualized terminal ileum was normal.    Plan was to transition to Stelara from Pentasa.  He reports that he is continue daily abdominal pain.  He reports that postprandial abdominal pain is now his norm.  He has been eating a liquid diet for 2 weeks.  He went to the emergency room on [place] Day and had a normal CT and labs at that time.  He had mildly elevated LFTs which were consistent with his previous values.  He was discharged after several doses of pain medication.    Does endorse that he is under significant stress at home due to caring for his ill father who is living in their driveway.  He is cooking and cleaning for him as well as taking care of his personal needs.  His wife is also struggling with some health problems at this time as well.    He has not yet started Stelara.  There have been issues regarding insurance approval and administration.  HPI  Past Medical History:   Diagnosis Date   • Aspirin long-term use    • Asymptomatic hyperuricemia    • BPH (benign prostatic hyperplasia)    • Chronic pain syndrome    • Crohn's colitis (CMS/HCC)    • Diabetes (CMS/HCC)    • Drug therapy    • Dyslipidemia    • Gout    • Hand weakness    • Herpes zoster    • Hypertension    • Knee pain    • Low back pain    • Obesity due to excess calories    • Peripheral neuropathy    • Personal history of gout    • Type 2 diabetes mellitus (CMS/HCC)    • Vitamin D deficiency      Past  Surgical History:   Procedure Laterality Date   • CATARACT EXTRACTION     • CHOLECYSTECTOMY     • COLONOSCOPY  2014   • COLONOSCOPY N/A 12/10/2019    diverticulosis, ih, polyps: TAs   • ENDOSCOPY N/A 12/10/2019    LA Grade A reflux esophagitis, gastritis, Path benign   • INTESTINAL BYPASS      intestinal stricturoplasty   • UPPER GASTROINTESTINAL ENDOSCOPY  2014       Current Outpatient Medications:   •  allopurinol (ZYLOPRIM) 300 MG tablet, Take 1 tablet by mouth Daily., Disp: 90 tablet, Rfl: 3  •  aspirin 81 MG tablet, Take  by mouth daily., Disp: , Rfl:   •  atorvastatin (LIPITOR) 40 MG tablet, TAKE 1 TABLET BY MOUTH ONCE DAILY, Disp: 90 tablet, Rfl: 0  •  colchicine 0.6 MG tablet, Take 1 tablet by mouth Daily., Disp: 30 tablet, Rfl: 6  •  dutasteride (AVODART) 0.5 MG capsule, TAKE 1 CAPSULE BY MOUTH ONCE DAILY, Disp: 90 capsule, Rfl: 0  •  famotidine (PEPCID) 20 MG tablet, Take 1 tablet by mouth At Night As Needed for Heartburn., Disp: 90 tablet, Rfl: 1  •  gabapentin (NEURONTIN) 600 MG tablet, Take 1 tablet by mouth 3 (Three) Times a Day., Disp: 270 tablet, Rfl: 3  •  HYDROcodone-acetaminophen (NORCO)  MG per tablet, Take 1 tablet by mouth 3 (Three) Times a Day As Needed. for pain, Disp: , Rfl: 0  •  JANUMET -1000 MG tablet, TAKE ONE TABLET BY MOUTH ONCE DAILY, Disp: 90 tablet, Rfl: 0  •  lisinopril-hydrochlorothiazide (PRINZIDE,ZESTORETIC) 20-12.5 MG per tablet, TAKE 1 TABLET BY MOUTH ONCE DAILY, Disp: 90 tablet, Rfl: 1  •  mesalamine (PENTASA) 500 MG CR capsule, TAKE 2 CAPSULES BY MOUTH ONCE DAILY, Disp: 180 capsule, Rfl: 0  •  Multiple Vitamin (MULTI VITAMIN DAILY PO), Take  by mouth daily., Disp: , Rfl:   •  NON FORMULARY, Tumeric 1000mg, Disp: , Rfl:   •  Omega-3 Fatty Acids (FISH OIL) 1000 MG capsule capsule, Take 1 capsule by mouth., Disp: , Rfl:   •  ondansetron (ZOFRAN) 4 MG tablet, TAKE 1 TABLET BY MOUTH EVERY 6 HOURS AS NEEDED FOR NAUSEA, Disp: 36 tablet, Rfl: 0  •  pantoprazole  (PROTONIX) 40 MG EC tablet, Take 1 tablet by mouth Daily., Disp: 30 tablet, Rfl: 5  •  vitamin B-12 (CYANOCOBALAMIN) 1000 MCG tablet, Take  by mouth daily., Disp: , Rfl:   •  dicyclomine (BENTYL) 20 MG tablet, Take 1 tablet by mouth 4 (Four) Times a Day Before Meals & at Bedtime As Needed (cramping) for up to 30 days., Disp: 90 tablet, Rfl: 5  PRN Meds:.  Allergies   Allergen Reactions   • Sulfa Antibiotics Rash   • Hydromorphone      Affects his chron's disease doesn't want this drug   • Hydromorphone Hcl      Affects his chron's disease doesn't want this drug. Makes him sick and does not take away pain   • Morphine And Related Nausea And Vomiting     Has problems with his chron's  Has problems with his chron's. Makes him sick and does not take away pain; makes crohn's worse   • Penicillins Rash     UNKNOWN-CHILDHOOD     Social History     Socioeconomic History   • Marital status:      Spouse name: Not on file   • Number of children: Not on file   • Years of education: Not on file   • Highest education level: Not on file   Tobacco Use   • Smoking status: Never Smoker   • Smokeless tobacco: Never Used   Substance and Sexual Activity   • Alcohol use: Yes     Comment: RARE   • Drug use: No   • Sexual activity: Defer     Family History   Problem Relation Age of Onset   • Heart disease Mother    • Heart attack Mother    • Diabetes Mother    • Gout Father      Review of Systems   Constitutional: Negative for appetite change and unexpected weight change.   Gastrointestinal: Positive for abdominal pain and diarrhea. Negative for blood in stool.   Psychiatric/Behavioral: Positive for dysphoric mood.   All other systems reviewed and are negative.    Vitals:    02/05/20 1549   BP: 170/90   Temp: 98.1 °F (36.7 °C)         02/05/20  1549   Weight: 134 kg (295 lb 3.2 oz)       Objective   Physical Exam   Constitutional: He appears well-developed and well-nourished.   HENT:   Head: Normocephalic and atraumatic.   Eyes: No  scleral icterus.   Pulmonary/Chest: Effort normal.   Abdominal: Soft. He exhibits no distension and no mass. There is no tenderness.   Neurological: He is alert.   Skin: Skin is warm and dry.   Psychiatric: He has a normal mood and affect.     No radiology results for the last 7 days    Assessment/Plan   Diagnoses and all orders for this visit:    Crohn's disease of small intestine with intestinal obstruction (CMS/HCC)  -     CT Abdomen Pelvis With Contrast Enterography    Other orders  -     dicyclomine (BENTYL) 20 MG tablet; Take 1 tablet by mouth 4 (Four) Times a Day Before Meals & at Bedtime As Needed (cramping) for up to 30 days.      Plan:  · No evidence of active disease found on recent EGD or colonoscopy-will proceed with small bowel enterography to assess for evidence of activity  · Continue Pentasa for now.  We will continue to try to sort out the issue with his Stelara approval  · Increase Bentyl to 20 mg 4 times a day as needed for cramping        Than 25 minutes spent participating in direct patient care and counseling as well as care coordination with over half spent in face-to-face contact with the patient

## 2020-02-10 ENCOUNTER — TELEPHONE (OUTPATIENT)
Dept: GASTROENTEROLOGY | Facility: CLINIC | Age: 60
End: 2020-02-10

## 2020-02-19 ENCOUNTER — PRIOR AUTHORIZATION (OUTPATIENT)
Dept: GASTROENTEROLOGY | Facility: CLINIC | Age: 60
End: 2020-02-19

## 2020-02-21 ENCOUNTER — TELEPHONE (OUTPATIENT)
Dept: GASTROENTEROLOGY | Facility: CLINIC | Age: 60
End: 2020-02-21

## 2020-02-25 ENCOUNTER — TELEPHONE (OUTPATIENT)
Dept: GASTROENTEROLOGY | Facility: CLINIC | Age: 60
End: 2020-02-25

## 2020-02-25 NOTE — TELEPHONE ENCOUNTER
Regarding: No message from patient  ----- Message from Gemini Christianson MA sent at 2/25/2020  9:14 AM EST -----  Spoke w/pharmacy- they will deliver Stelara pen on 2/27/2020 w/patient name on box    Patient has  appt to have first injection   This encounter does not contain a message from the patient.

## 2020-02-25 NOTE — TELEPHONE ENCOUNTER
Pt Advice Request      Gemini Christianson MA [facility] [place] Clinical 1 Pool 5 hours ago (9:14 AM)      Spoke w/pharmacy- they will deliver Stelara pen on 2/27/2020 w/patient name on box    Patient has  appt to have first injection     Routing comment        Gemini Christianson MA [facility] [place] Clinical 1 Pool Yesterday (8:48 AM)      Spoke w/patients wife-his first Stelara pen to be sent here per [facility]-to be given 4/21/2020  patient w/arrive @ 9:00    Routing comment      Message to Gemini Santamaria RN.

## 2020-03-05 ENCOUNTER — HOSPITAL ENCOUNTER (OUTPATIENT)
Dept: CT IMAGING | Facility: HOSPITAL | Age: 60
Discharge: HOME | End: 2020-03-05
Admitting: INTERNAL MEDICINE

## 2020-03-05 LAB — CREAT BLDA-MCNC: 0.9 MG/DL (ref 0.6–1.3)

## 2020-03-05 PROCEDURE — 82565 ASSAY OF CREATININE: CPT

## 2020-03-05 PROCEDURE — 25010000002 IOPAMIDOL 61 % SOLUTION: Performed by: INTERNAL MEDICINE

## 2020-03-05 PROCEDURE — 74177 CT ABD & PELVIS W/CONTRAST: CPT

## 2020-03-05 RX ADMIN — IOPAMIDOL 95 ML: 612 INJECTION, SOLUTION INTRAVENOUS at 13:04

## 2020-03-05 RX ADMIN — BARIUM SULFATE 450 ML: 1 SUSPENSION ORAL at 12:05

## 2020-03-10 ENCOUNTER — TELEPHONE (OUTPATIENT)
Dept: GASTROENTEROLOGY | Facility: CLINIC | Age: 60
End: 2020-03-10

## 2020-03-11 NOTE — TELEPHONE ENCOUNTER
No evidence of active crohn's by CT - new small midline abdominal wall hernia since last scan (no obstruction due to hernia).  Has he rcd his stelara infusion?

## 2020-03-17 NOTE — TELEPHONE ENCOUNTER
Call to pt.  Advise per Dr Calle note.      Verb understanding.  States has stelara infusion one wk prior to CT.      States understood that DR Calle would be calling him with a f/u appt- advise that Dr Calle out of office.  Will send question.  Verb understanding.

## 2020-04-10 ENCOUNTER — TELEPHONE (OUTPATIENT)
Dept: GASTROENTEROLOGY | Facility: CLINIC | Age: 60
End: 2020-04-10

## 2020-04-10 NOTE — TELEPHONE ENCOUNTER
----- Message from Marian Booth sent at 4/10/2020  9:05 AM EDT -----  Regarding: Medication  Contact: [phone]  Jose Antonio from [facility] is calling to talk with a nurse regarding a medication (I did not see in the list).

## 2020-04-10 NOTE — TELEPHONE ENCOUNTER
Called [facility] at [phone] and spoke with pharmacist Eliceo who advised they were calling to arrange delivery of pt's Stelara.He  Advised to have pt contact them.      Called pt and pt reports that they are making him get the meds delivered to our office due to a potential reaction. Asked pt if he has had his infusion and pt reports yes and he did not have any reaction.  Advised pt I will contact [facility] and see if they can have injections delivered to pt.  Pt verb understanding.     Called [facility] again and spoke with pharmacist Zachariah and advised pt has received the stelara infusion without problems and would like to have the injections delivered to his home.  Pt has been on humira and is comfortable with giving himself the shot.  After being on hold for 20min , she advised this would be fine , but the pt has to contact them to arrange delivery.     Called pt again and advised of the above.  Number given to pt.  Pt cancelled appt for injection due to he knows how to give injections.  Update sent to Dr Calle.

## 2020-04-20 ENCOUNTER — TELEMEDICINE (OUTPATIENT)
Dept: GASTROENTEROLOGY | Facility: CLINIC | Age: 60
End: 2020-04-20

## 2020-04-20 VITALS — BODY MASS INDEX: 39.1 KG/M2 | WEIGHT: 295 LBS | HEIGHT: 73 IN

## 2020-04-20 DIAGNOSIS — R10.30 LOWER ABDOMINAL PAIN: ICD-10-CM

## 2020-04-20 DIAGNOSIS — K21.9 GASTROESOPHAGEAL REFLUX DISEASE, ESOPHAGITIS PRESENCE NOT SPECIFIED: ICD-10-CM

## 2020-04-20 DIAGNOSIS — K50.00 CROHN'S DISEASE OF SMALL INTESTINE WITHOUT COMPLICATION (HCC): Primary | ICD-10-CM

## 2020-04-20 PROCEDURE — 99213 OFFICE O/P EST LOW 20 MIN: CPT | Performed by: INTERNAL MEDICINE

## 2020-04-20 RX ORDER — PANTOPRAZOLE SODIUM 40 MG/1
40 TABLET, DELAYED RELEASE ORAL DAILY
Qty: 90 TABLET | Refills: 3 | Status: SHIPPED | OUTPATIENT
Start: 2020-04-20 | End: 2022-09-19

## 2020-04-20 RX ORDER — USTEKINUMAB 90 MG/ML
INJECTION, SOLUTION SUBCUTANEOUS
COMMUNITY
Start: 2020-04-10 | End: 2021-02-22 | Stop reason: SDUPTHER

## 2020-04-20 NOTE — PROGRESS NOTES
Subjective   Chief Complaint   Patient presents with   • Follow-up   • Crohn's Disease       Ramiro Tovar JR is a  60 y.o. male here for a follow up visit for Crohn's disease of the small intestine.    This encounter was provided via real-time audio/video technology.  Due to issues with the audio visual component of epic, face time was used for the A/V component.    Was seen initially in the office 2019.  Previously had been followed by Dr. Sandro Sotomayor.  He underwent a colonoscopy and EGD in December 2019.  EGD showed LA class a esophagitis and gastritis-he is on pantoprazole daily.  2 tubular adenomas removed from the colon.  Biopsies of the colon showed no active inflammation related to Crohn's.  Visualized terminal ileum was normal.    Plan was to transition to Stelara from [facility].  He went to the emergency room on [place] Day 2019 and had a normal CT and labs at that time.  He had mildly elevated LFTs which were consistent with his previous values.  He was discharged after several doses of pain medication since his last visit he is started Stelara.    He completed the initial infusion of Stelara on February 5.  His first subcutaneous injection is tomorrow.  He is felt a whole lot better since about 2 weeks after his initial infusion.  He does report that he felt really hot at night for about the first 10 to 14 days after the infusion.  This resolved with standing in front of a fan.  He has not had any similar symptoms since.  He reports that his abdominal pain has decreased significantly since starting Stelara.  He has had only 3 episodes of abdominal pain since his infusion.  He reports that 2 out of the 3 were related to diet.  Prior to Stelara he was having abdominal pain about 2 times a week.  Diarrhea has essentially resolved.  He did have some diarrhea yesterday but both he and his wife had symptoms after eating some Mexican.  He has had no recent fevers chills or infections.  He has had no hospital  visit since I saw him last.  His appetite is good.  His heartburn is well controlled with pantoprazole in the morning.  He has not required any medication for breakthrough.    He was recently evaluated by his pain physician who started him on lucmyra-year-old 0.18 mg and he has decreased his as needed pain medicine significantly.  Overall he is feeling really well.        .   HPI  Past Medical History:   Diagnosis Date   • Aspirin long-term use    • Asymptomatic hyperuricemia    • BPH (benign prostatic hyperplasia)    • Chronic pain syndrome    • Crohn's colitis (CMS/HCC)    • Diabetes (CMS/HCC)    • Drug therapy    • Dyslipidemia    • Gout    • Hand weakness    • Herpes zoster    • Hypertension    • Knee pain    • Low back pain    • Obesity due to excess calories    • Peripheral neuropathy    • Personal history of gout    • Type 2 diabetes mellitus (CMS/HCC)    • Vitamin D deficiency      Past Surgical History:   Procedure Laterality Date   • CATARACT EXTRACTION     • CHOLECYSTECTOMY     • COLONOSCOPY  2014   • COLONOSCOPY N/A 12/10/2019    diverticulosis, ih, polyps: TAs   • ENDOSCOPY N/A 12/10/2019    LA Grade A reflux esophagitis, gastritis, Path benign   • INTESTINAL BYPASS      intestinal stricturoplasty   • UPPER GASTROINTESTINAL ENDOSCOPY  2014       Current Outpatient Medications:   •  allopurinol (ZYLOPRIM) 300 MG tablet, Take 1 tablet by mouth Daily., Disp: 90 tablet, Rfl: 3  •  aspirin 81 MG tablet, Take  by mouth daily., Disp: , Rfl:   •  atorvastatin (LIPITOR) 40 MG tablet, TAKE 1 TABLET BY MOUTH ONCE DAILY, Disp: 90 tablet, Rfl: 0  •  colchicine 0.6 MG tablet, Take 1 tablet by mouth Daily. (Patient taking differently: Take 0.6 mg by mouth As Needed.), Disp: 30 tablet, Rfl: 6  •  dutasteride (AVODART) 0.5 MG capsule, TAKE 1 CAPSULE BY MOUTH ONCE DAILY, Disp: 90 capsule, Rfl: 0  •  gabapentin (NEURONTIN) 600 MG tablet, Take 1 tablet by mouth 3 (Three) Times a Day., Disp: 270 tablet, Rfl: 3  •   HYDROcodone-acetaminophen (NORCO)  MG per tablet, Take 1 tablet by mouth 3 (Three) Times a Day As Needed. for pain, Disp: , Rfl: 0  •  JANUMET -1000 MG tablet, TAKE ONE TABLET BY MOUTH ONCE DAILY, Disp: 90 tablet, Rfl: 0  •  lisinopril-hydrochlorothiazide (PRINZIDE,ZESTORETIC) 20-12.5 MG per tablet, TAKE 1 TABLET BY MOUTH ONCE DAILY, Disp: 90 tablet, Rfl: 1  •  mesalamine (PENTASA) 500 MG CR capsule, TAKE 2 CAPSULES BY MOUTH ONCE DAILY, Disp: 180 capsule, Rfl: 0  •  Multiple Vitamin (MULTI VITAMIN DAILY PO), Take  by mouth daily., Disp: , Rfl:   •  NON FORMULARY, Tumeric 1000mg, Disp: , Rfl:   •  Omega-3 Fatty Acids (FISH OIL) 1000 MG capsule capsule, Take 1 capsule by mouth., Disp: , Rfl:   •  ondansetron (ZOFRAN) 4 MG tablet, TAKE 1 TABLET BY MOUTH EVERY 6 HOURS AS NEEDED FOR NAUSEA, Disp: 36 tablet, Rfl: 0  •  pantoprazole (PROTONIX) 40 MG EC tablet, Take 1 tablet by mouth Daily., Disp: 90 tablet, Rfl: 3  •  ustekinumab (Stelara) 130 MG/26ML solution injection, Administer STELARA 130mg IV, Disp: , Rfl:   •  vitamin B-12 (CYANOCOBALAMIN) 1000 MCG tablet, Take  by mouth daily., Disp: , Rfl:   •  STELARA 90 MG/ML solution prefilled syringe Injection, , Disp: , Rfl:   PRN Meds:.  Allergies   Allergen Reactions   • Sulfa Antibiotics Rash   • Hydromorphone      Affects his chron's disease doesn't want this drug   • Hydromorphone Hcl      Affects his chron's disease doesn't want this drug. Makes him sick and does not take away pain   • Morphine And Related Nausea And Vomiting     Has problems with his chron's  Has problems with his chron's. Makes him sick and does not take away pain; makes crohn's worse   • Penicillins Rash     UNKNOWN-CHILDHOOD     Social History     Socioeconomic History   • Marital status:      Spouse name: Not on file   • Number of children: Not on file   • Years of education: Not on file   • Highest education level: Not on file   Tobacco Use   • Smoking status: Never Smoker   •  Smokeless tobacco: Never Used   Substance and Sexual Activity   • Alcohol use: Yes     Comment: RARE   • Drug use: No   • Sexual activity: Defer     Family History   Problem Relation Age of Onset   • Heart disease Mother    • Heart attack Mother    • Diabetes Mother    • Gout Father      Review of Systems   Constitutional: Negative for appetite change and unexpected weight change.   HENT: Negative for trouble swallowing.    Gastrointestinal: Positive for abdominal pain. Negative for blood in stool, nausea and vomiting.   All other systems reviewed and are negative.    There were no vitals filed for this visit.      04/20/20  1154   Weight: 134 kg (295 lb)       Objective   Physical Exam     Alert & Oriented, no acute distress  Head: normocephalic, atraumatic  ENT: normal lips, teeth and gums  Eyes: normal pupils, sclera anicteric  Neck: normal ROM, no mass  Skin: no visible rash or jaundice  Pulm/chest: normal respiratory effort; normal anterior chest symmetry  Psych: normal judgement and insight    No radiology results for the last 7 days    Assessment/Plan   Diagnoses and all orders for this visit:    Crohn's disease of small intestine without complication (CMS/HCC)    Gastroesophageal reflux disease, esophagitis presence not specified  -     pantoprazole (PROTONIX) 40 MG EC tablet; Take 1 tablet by mouth Daily.    Lower abdominal pain    Other orders  -     ustekinumab (Stelara) 130 MG/26ML solution injection; Administer STELARA 130mg IV  -     STELARA 90 MG/ML solution prefilled syringe Injection      Plan:  · He seems to be having a really positive response to Stelara.  This is very encouraging.  Recommend continue Stelara as per usual schedule.  He is due for his first subcutaneous injection tomorrow.  · Recommend that he complete the Pentasa he has at home and that he stop it thereafter.  No need to refill this at this point.  · Continue pantoprazole in the morning-Pepcid as needed although he has required  very little as needed medication.  · We will plan on 3-month follow-up as long as he is doing well-should his symptoms worsen in the interim, patient has been advised to call the office.    Time of call is 10 minutes.  Patient has consented to proceed with a telehealth (telephone or telemedicine) visit in lieu of an office visit given the coronavirus epidemic.

## 2020-04-21 RX ORDER — DUTASTERIDE 0.5 MG/1
CAPSULE, LIQUID FILLED ORAL
Qty: 90 CAPSULE | Refills: 0 | Status: SHIPPED | OUTPATIENT
Start: 2020-04-21 | End: 2020-07-06 | Stop reason: SDUPTHER

## 2020-04-21 RX ORDER — ONDANSETRON 4 MG/1
TABLET, FILM COATED ORAL
Qty: 36 TABLET | Refills: 0 | Status: SHIPPED | OUTPATIENT
Start: 2020-04-21 | End: 2020-07-06 | Stop reason: SDUPTHER

## 2020-04-21 RX ORDER — ATORVASTATIN CALCIUM 40 MG/1
TABLET, FILM COATED ORAL
Qty: 90 TABLET | Refills: 0 | Status: SHIPPED | OUTPATIENT
Start: 2020-04-21 | End: 2020-07-06 | Stop reason: SDUPTHER

## 2020-05-07 DIAGNOSIS — E78.49 OTHER HYPERLIPIDEMIA: ICD-10-CM

## 2020-05-07 DIAGNOSIS — I10 BENIGN ESSENTIAL HYPERTENSION: ICD-10-CM

## 2020-05-07 DIAGNOSIS — E11.9 TYPE 2 DIABETES MELLITUS WITHOUT COMPLICATION, WITHOUT LONG-TERM CURRENT USE OF INSULIN (HCC): Primary | ICD-10-CM

## 2020-05-07 LAB
ALBUMIN SERPL-MCNC: 3.7 G/DL (ref 3.5–5.2)
ALBUMIN/GLOB SERPL: 1.5 G/DL
ALP SERPL-CCNC: 65 U/L (ref 39–117)
ALT SERPL-CCNC: 70 U/L (ref 1–41)
AST SERPL-CCNC: 48 U/L (ref 1–40)
BILIRUB SERPL-MCNC: 0.6 MG/DL (ref 0.2–1.2)
BUN SERPL-MCNC: 14 MG/DL (ref 8–23)
BUN/CREAT SERPL: 13.9 (ref 7–25)
CALCIUM SERPL-MCNC: 9.3 MG/DL (ref 8.6–10.5)
CHLORIDE SERPL-SCNC: 101 MMOL/L (ref 98–107)
CHOLEST SERPL-MCNC: 98 MG/DL (ref 0–200)
CO2 SERPL-SCNC: 27.6 MMOL/L (ref 22–29)
CREAT SERPL-MCNC: 1.01 MG/DL (ref 0.76–1.27)
GLOBULIN SER CALC-MCNC: 2.4 GM/DL
GLUCOSE SERPL-MCNC: 144 MG/DL (ref 65–99)
HBA1C MFR BLD: 7.6 % (ref 4.8–5.6)
HDLC SERPL-MCNC: 22 MG/DL (ref 40–60)
LDLC SERPL CALC-MCNC: 44 MG/DL (ref 0–100)
POTASSIUM SERPL-SCNC: 3.8 MMOL/L (ref 3.5–5.2)
PROT SERPL-MCNC: 6.1 G/DL (ref 6–8.5)
SODIUM SERPL-SCNC: 139 MMOL/L (ref 136–145)
TRIGL SERPL-MCNC: 161 MG/DL (ref 0–150)
VLDLC SERPL CALC-MCNC: 32.2 MG/DL (ref 5–40)

## 2020-07-06 DIAGNOSIS — K21.9 GASTROESOPHAGEAL REFLUX DISEASE, ESOPHAGITIS PRESENCE NOT SPECIFIED: ICD-10-CM

## 2020-07-06 RX ORDER — DUTASTERIDE 0.5 MG/1
0.5 CAPSULE, LIQUID FILLED ORAL DAILY
Qty: 90 CAPSULE | Refills: 1 | Status: SHIPPED | OUTPATIENT
Start: 2020-07-06 | End: 2021-01-11 | Stop reason: SDUPTHER

## 2020-07-06 RX ORDER — ONDANSETRON 4 MG/1
4 TABLET, FILM COATED ORAL EVERY 6 HOURS PRN
Qty: 36 TABLET | Refills: 1 | Status: SHIPPED | OUTPATIENT
Start: 2020-07-06 | End: 2020-10-05 | Stop reason: SDUPTHER

## 2020-07-06 RX ORDER — LISINOPRIL AND HYDROCHLOROTHIAZIDE 20; 12.5 MG/1; MG/1
1 TABLET ORAL DAILY
Qty: 90 TABLET | Refills: 1 | Status: SHIPPED | OUTPATIENT
Start: 2020-07-06 | End: 2021-01-11 | Stop reason: SDUPTHER

## 2020-07-06 RX ORDER — ALLOPURINOL 300 MG/1
300 TABLET ORAL DAILY
Qty: 90 TABLET | Refills: 1 | Status: SHIPPED | OUTPATIENT
Start: 2020-07-06 | End: 2021-01-11 | Stop reason: SDUPTHER

## 2020-07-06 RX ORDER — ATORVASTATIN CALCIUM 40 MG/1
40 TABLET, FILM COATED ORAL DAILY
Qty: 90 TABLET | Refills: 1 | Status: SHIPPED | OUTPATIENT
Start: 2020-07-06 | End: 2021-01-11 | Stop reason: SDUPTHER

## 2020-07-07 RX ORDER — PANTOPRAZOLE SODIUM 40 MG/1
40 TABLET, DELAYED RELEASE ORAL DAILY
Qty: 90 TABLET | Refills: 3 | OUTPATIENT
Start: 2020-07-07

## 2020-09-02 ENCOUNTER — OFFICE VISIT (OUTPATIENT)
Dept: GASTROENTEROLOGY | Facility: CLINIC | Age: 60
End: 2020-09-02

## 2020-09-02 VITALS — TEMPERATURE: 97 F | WEIGHT: 286.4 LBS | BODY MASS INDEX: 37.79 KG/M2

## 2020-09-02 DIAGNOSIS — K50.012 CROHN'S DISEASE OF SMALL INTESTINE WITH INTESTINAL OBSTRUCTION (HCC): Primary | ICD-10-CM

## 2020-09-02 DIAGNOSIS — R10.13 DYSPEPSIA: ICD-10-CM

## 2020-09-02 PROCEDURE — 99213 OFFICE O/P EST LOW 20 MIN: CPT | Performed by: INTERNAL MEDICINE

## 2020-09-02 NOTE — PROGRESS NOTES
Subjective   Chief Complaint   Patient presents with   • Follow-up   • Crohn's Disease       Ramiro Tovar JR is a  60 y.o. male here for a follow up visit for Crohn's disease of the small intestine.     Was seen initially in the office 2019.  Previously had been followed by Dr. Sandro Sotomayor.  He underwent a colonoscopy and EGD in December 2019.  EGD showed LA class a esophagitis and gastritis-he is on pantoprazole daily.  2 tubular adenomas removed from the colon.  Biopsies of the colon showed no active inflammation related to Crohn's.  Visualized terminal ileum was normal.    Plan was to transition to Stelara from Pentasa.  He went to the emergency room on [place] Day 2019 2020 and had a normal CT and labs at that time.  He had mildly elevated LFTs which were consistent with his previous values.  He was discharged after several doses of pain medication since his last visit he is started Stelara.    He completed the initial infusion of Stelara on February 5, 2020.  He has only had one episode of n/v since starting stelara.  He has had much decreased bouts of diarrhea - only isolated incidents at this time.  He has had a lot of back pain due to a muscle tear in his back.  He complains of a queasy feeling in his stomach after eating- not nauseated, no pain.      No extraintestinal manifestations.  HPI  Past Medical History:   Diagnosis Date   • Aspirin long-term use    • Asymptomatic hyperuricemia    • BPH (benign prostatic hyperplasia)    • Chronic pain syndrome    • Crohn's colitis (CMS/HCC)    • Diabetes (CMS/HCC)    • Drug therapy    • Dyslipidemia    • Gout    • Hand weakness    • Herpes zoster    • Hypertension    • Knee pain    • Low back pain    • Obesity due to excess calories    • Peripheral neuropathy    • Personal history of gout    • Type 2 diabetes mellitus (CMS/HCC)    • Vitamin D deficiency      Past Surgical History:   Procedure Laterality Date   • CATARACT EXTRACTION     • CHOLECYSTECTOMY     •  COLONOSCOPY  2014   • COLONOSCOPY N/A 12/10/2019    diverticulosis, ih, polyps: TAs   • ENDOSCOPY N/A 12/10/2019    LA Grade A reflux esophagitis, gastritis, Path benign   • INTESTINAL BYPASS      intestinal stricturoplasty   • UPPER GASTROINTESTINAL ENDOSCOPY  2014       Current Outpatient Medications:   •  allopurinol (ZYLOPRIM) 300 MG tablet, Take 1 tablet by mouth Daily., Disp: 90 tablet, Rfl: 1  •  aspirin 81 MG tablet, Take  by mouth daily., Disp: , Rfl:   •  atorvastatin (LIPITOR) 40 MG tablet, Take 1 tablet by mouth Daily., Disp: 90 tablet, Rfl: 1  •  colchicine 0.6 MG tablet, Take 1 tablet by mouth Daily. (Patient taking differently: Take 0.6 mg by mouth As Needed.), Disp: 30 tablet, Rfl: 6  •  dutasteride (AVODART) 0.5 MG capsule, Take 1 capsule by mouth Daily., Disp: 90 capsule, Rfl: 1  •  gabapentin (NEURONTIN) 600 MG tablet, Take 1 tablet by mouth 3 (Three) Times a Day., Disp: 270 tablet, Rfl: 3  •  HYDROcodone-acetaminophen (NORCO)  MG per tablet, Take 1 tablet by mouth 3 (Three) Times a Day As Needed. for pain, Disp: , Rfl: 0  •  JANUMET -1000 MG tablet, TAKE ONE TABLET BY MOUTH ONCE DAILY, Disp: 90 tablet, Rfl: 0  •  lisinopril-hydrochlorothiazide (PRINZIDE,ZESTORETIC) 20-12.5 MG per tablet, Take 1 tablet by mouth Daily., Disp: 90 tablet, Rfl: 1  •  Multiple Vitamin (MULTI VITAMIN DAILY PO), Take  by mouth daily., Disp: , Rfl:   •  NON FORMULARY, Tumeric 1000mg, Disp: , Rfl:   •  Omega-3 Fatty Acids (FISH OIL) 1000 MG capsule capsule, Take 1 capsule by mouth., Disp: , Rfl:   •  ondansetron (ZOFRAN) 4 MG tablet, Take 1 tablet by mouth Every 6 (Six) Hours As Needed for Nausea or Vomiting. for nausea, Disp: 36 tablet, Rfl: 1  •  pantoprazole (PROTONIX) 40 MG EC tablet, Take 1 tablet by mouth Daily., Disp: 90 tablet, Rfl: 3  •  STELARA 90 MG/ML solution prefilled syringe Injection, , Disp: , Rfl:   •  ustekinumab (Stelara) 130 MG/26ML solution injection, Administer STELARA 130mg IV, Disp: ,  Rfl:   •  vitamin B-12 (CYANOCOBALAMIN) 1000 MCG tablet, Take  by mouth daily., Disp: , Rfl:   •  mesalamine (PENTASA) 500 MG CR capsule, TAKE 2 CAPSULES BY MOUTH ONCE DAILY, Disp: 180 capsule, Rfl: 0  PRN Meds:.  Allergies   Allergen Reactions   • Sulfa Antibiotics Rash   • Hydromorphone      Affects his chron's disease doesn't want this drug   • Hydromorphone Hcl      Affects his chron's disease doesn't want this drug. Makes him sick and does not take away pain   • Morphine And Related Nausea And Vomiting     Has problems with his chron's  Has problems with his chron's. Makes him sick and does not take away pain; makes crohn's worse   • Penicillins Rash     UNKNOWN-CHILDHOOD     Social History     Socioeconomic History   • Marital status:      Spouse name: Not on file   • Number of children: Not on file   • Years of education: Not on file   • Highest education level: Not on file   Tobacco Use   • Smoking status: Never Smoker   • Smokeless tobacco: Never Used   Substance and Sexual Activity   • Alcohol use: Yes     Comment: RARE   • Drug use: No   • Sexual activity: Defer     Family History   Problem Relation Age of Onset   • Heart disease Mother    • Heart attack Mother    • Diabetes Mother    • Gout Father      Review of Systems   Constitutional: Negative for appetite change, fever and unexpected weight change.   Gastrointestinal: Positive for diarrhea. Negative for abdominal pain, blood in stool and constipation.   Musculoskeletal: Positive for back pain.   All other systems reviewed and are negative.    Vitals:    09/02/20 1247   Temp: 97 °F (36.1 °C)         09/02/20  1247   Weight: 130 kg (286 lb 6.4 oz)       Objective   Physical Exam   Constitutional: He appears well-developed and well-nourished.   HENT:   Head: Normocephalic and atraumatic.   Eyes: No scleral icterus.   Pulmonary/Chest: Effort normal. No respiratory distress.   Abdominal: Soft. He exhibits no distension. There is no tenderness.    Musculoskeletal: He exhibits no edema.   Neurological: He is alert.   Skin: Skin is warm and dry.   Psychiatric: He has a normal mood and affect.     No radiology results for the last 7 days    Assessment/Plan   Diagnoses and all orders for this visit:    Crohn's disease of small intestine with intestinal obstruction (CMS/HCC)  -     CBC & Differential  -     Comprehensive Metabolic Panel  -     Vitamin D 25 Hydroxy  -     NM Gastric Emptying; Future    Dyspepsia      Plan:  · He is doing much better overall since he started Stelara.  He does have some occasional queasiness-I am concerned that he may have some element of gastroparesis with his chronic pain medication and history of diabetes.  This may also contribute to his episodic nausea and vomiting.  We will check a gastric emptying study for further evaluation  · Continue Stelara  · Labs today  · Advised patient to get flu vaccine when available  · Return to clinic in 3 months

## 2020-09-03 ENCOUNTER — TELEPHONE (OUTPATIENT)
Dept: GASTROENTEROLOGY | Facility: CLINIC | Age: 60
End: 2020-09-03

## 2020-09-03 LAB
25(OH)D3+25(OH)D2 SERPL-MCNC: 45.5 NG/ML (ref 30–100)
ALBUMIN SERPL-MCNC: 4.5 G/DL (ref 3.8–4.9)
ALBUMIN/GLOB SERPL: 1.8 {RATIO} (ref 1.2–2.2)
ALP SERPL-CCNC: 71 IU/L (ref 39–117)
ALT SERPL-CCNC: 77 IU/L (ref 0–44)
AST SERPL-CCNC: 72 IU/L (ref 0–40)
BASOPHILS # BLD AUTO: 0.1 X10E3/UL (ref 0–0.2)
BASOPHILS NFR BLD AUTO: 1 %
BILIRUB SERPL-MCNC: 0.4 MG/DL (ref 0–1.2)
BUN SERPL-MCNC: 16 MG/DL (ref 8–27)
BUN/CREAT SERPL: 13 (ref 10–24)
CALCIUM SERPL-MCNC: 10.1 MG/DL (ref 8.6–10.2)
CHLORIDE SERPL-SCNC: 97 MMOL/L (ref 96–106)
CO2 SERPL-SCNC: 26 MMOL/L (ref 20–29)
CREAT SERPL-MCNC: 1.2 MG/DL (ref 0.76–1.27)
EOSINOPHIL # BLD AUTO: 0.2 X10E3/UL (ref 0–0.4)
EOSINOPHIL NFR BLD AUTO: 2 %
ERYTHROCYTE [DISTWIDTH] IN BLOOD BY AUTOMATED COUNT: 14.7 % (ref 11.6–15.4)
GLOBULIN SER CALC-MCNC: 2.5 G/DL (ref 1.5–4.5)
GLUCOSE SERPL-MCNC: 168 MG/DL (ref 65–99)
HCT VFR BLD AUTO: 41.5 % (ref 37.5–51)
HGB BLD-MCNC: 13.8 G/DL (ref 13–17.7)
IMM GRANULOCYTES # BLD AUTO: 0 X10E3/UL (ref 0–0.1)
IMM GRANULOCYTES NFR BLD AUTO: 0 %
LYMPHOCYTES # BLD AUTO: 1.1 X10E3/UL (ref 0.7–3.1)
LYMPHOCYTES NFR BLD AUTO: 10 %
MCH RBC QN AUTO: 28.2 PG (ref 26.6–33)
MCHC RBC AUTO-ENTMCNC: 33.3 G/DL (ref 31.5–35.7)
MCV RBC AUTO: 85 FL (ref 79–97)
MONOCYTES # BLD AUTO: 0.6 X10E3/UL (ref 0.1–0.9)
MONOCYTES NFR BLD AUTO: 6 %
NEUTROPHILS # BLD AUTO: 8.5 X10E3/UL (ref 1.4–7)
NEUTROPHILS NFR BLD AUTO: 81 %
PLATELET # BLD AUTO: 204 X10E3/UL (ref 150–450)
POTASSIUM SERPL-SCNC: 4.9 MMOL/L (ref 3.5–5.2)
PROT SERPL-MCNC: 7 G/DL (ref 6–8.5)
RBC # BLD AUTO: 4.89 X10E6/UL (ref 4.14–5.8)
SODIUM SERPL-SCNC: 139 MMOL/L (ref 134–144)
WBC # BLD AUTO: 10.4 X10E3/UL (ref 3.4–10.8)

## 2020-10-05 RX ORDER — SITAGLIPTIN AND METFORMIN HYDROCHLORIDE 1000; 100 MG/1; MG/1
TABLET, FILM COATED, EXTENDED RELEASE ORAL
Qty: 90 TABLET | Refills: 0 | Status: SHIPPED | OUTPATIENT
Start: 2020-10-05 | End: 2021-01-11 | Stop reason: SDUPTHER

## 2020-10-05 RX ORDER — ONDANSETRON 4 MG/1
TABLET, FILM COATED ORAL
Qty: 36 TABLET | Refills: 0 | Status: SHIPPED | OUTPATIENT
Start: 2020-10-05

## 2020-11-02 ENCOUNTER — OFFICE VISIT (OUTPATIENT)
Dept: FAMILY MEDICINE CLINIC | Facility: CLINIC | Age: 60
End: 2020-11-02

## 2020-11-02 VITALS
WEIGHT: 288 LBS | BODY MASS INDEX: 38.17 KG/M2 | DIASTOLIC BLOOD PRESSURE: 78 MMHG | HEIGHT: 73 IN | HEART RATE: 65 BPM | SYSTOLIC BLOOD PRESSURE: 134 MMHG | OXYGEN SATURATION: 98 %

## 2020-11-02 DIAGNOSIS — E79.0 HYPERURICEMIA: ICD-10-CM

## 2020-11-02 DIAGNOSIS — Z23 FLU VACCINE NEED: ICD-10-CM

## 2020-11-02 DIAGNOSIS — K50.012 CROHN'S DISEASE OF SMALL INTESTINE WITH INTESTINAL OBSTRUCTION (HCC): ICD-10-CM

## 2020-11-02 DIAGNOSIS — E11.9 TYPE 2 DIABETES MELLITUS WITHOUT COMPLICATION, WITHOUT LONG-TERM CURRENT USE OF INSULIN (HCC): ICD-10-CM

## 2020-11-02 DIAGNOSIS — I10 BENIGN ESSENTIAL HYPERTENSION: ICD-10-CM

## 2020-11-02 DIAGNOSIS — E78.49 OTHER HYPERLIPIDEMIA: ICD-10-CM

## 2020-11-02 DIAGNOSIS — Z00.00 WELLNESS EXAMINATION: Primary | ICD-10-CM

## 2020-11-02 PROCEDURE — 90686 IIV4 VACC NO PRSV 0.5 ML IM: CPT | Performed by: FAMILY MEDICINE

## 2020-11-02 PROCEDURE — 90471 IMMUNIZATION ADMIN: CPT | Performed by: FAMILY MEDICINE

## 2020-11-02 PROCEDURE — 99396 PREV VISIT EST AGE 40-64: CPT | Performed by: FAMILY MEDICINE

## 2020-11-02 NOTE — PROGRESS NOTES
Subjective   Ramiro Tovar JR is a 60 y.o. male. Presents today for   Chief Complaint   Patient presents with   • Annual Exam     wellness   • Diabetes   • Hypertension       History of Present Illness  Patient here for wellness visit;  Has dm2, arterial htn, hld and crohns disease;  Hx of gout;  Doing ok;  No cp/soa; n o abd pain;  No n/v;  Did just see GI.    Review of Systems   Respiratory: Negative for shortness of breath.    Cardiovascular: Negative for chest pain and palpitations.   Gastrointestinal: Positive for diarrhea. Negative for abdominal pain, nausea and vomiting.       Patient Active Problem List   Diagnosis   • Hyperuricemia   • Benign essential hypertension   • Benign prostatic hypertrophy without urinary obstruction   • Chronic pain syndrome   • Crohn's disease of colon (CMS/HCC)   • Dyslipidemia   • Gout   • Weakness of hand   • Knee pain   • Low back pain   • Peripheral neuropathy   • Type 2 diabetes mellitus without complication, without long-term current use of insulin (CMS/HCC)   • Vitamin D deficiency   • Crohn's disease (CMS/HCC)   • History of colectomy   • Hyperlipidemia   • Calculus of kidney   • Adult body mass index greater than 30   • Small bowel obstruction (CMS/HCC)   • Gastroesophageal reflux disease   • Crohn's disease of small intestine with intestinal obstruction (CMS/HCC)       Social History     Socioeconomic History   • Marital status:      Spouse name: Not on file   • Number of children: Not on file   • Years of education: Not on file   • Highest education level: Not on file   Tobacco Use   • Smoking status: Never Smoker   • Smokeless tobacco: Never Used   Substance and Sexual Activity   • Alcohol use: Yes     Comment: RARE   • Drug use: No   • Sexual activity: Defer       Allergies   Allergen Reactions   • Sulfa Antibiotics Rash   • Hydromorphone      Affects his chron's disease doesn't want this drug   • Hydromorphone Hcl      Affects his chron's disease doesn't  want this drug. Makes him sick and does not take away pain   • Morphine And Related Nausea And Vomiting     Has problems with his chron's  Has problems with his chron's. Makes him sick and does not take away pain; makes crohn's worse   • Penicillins Rash     UNKNOWN-CHILDHOOD       Current Outpatient Medications on File Prior to Visit   Medication Sig Dispense Refill   • allopurinol (ZYLOPRIM) 300 MG tablet Take 1 tablet by mouth Daily. 90 tablet 1   • aspirin 81 MG tablet Take  by mouth daily.     • atorvastatin (LIPITOR) 40 MG tablet Take 1 tablet by mouth Daily. 90 tablet 1   • colchicine 0.6 MG tablet Take 1 tablet by mouth Daily. (Patient taking differently: Take 0.6 mg by mouth As Needed.) 30 tablet 6   • dutasteride (AVODART) 0.5 MG capsule Take 1 capsule by mouth Daily. 90 capsule 1   • gabapentin (NEURONTIN) 600 MG tablet Take 1 tablet by mouth 3 (Three) Times a Day. 270 tablet 3   • HYDROcodone-acetaminophen (NORCO)  MG per tablet Take 1 tablet by mouth 3 (Three) Times a Day As Needed. for pain  0   • Janumet -1000 MG tablet Take 1 tablet by mouth once daily 90 tablet 0   • lisinopril-hydrochlorothiazide (PRINZIDE,ZESTORETIC) 20-12.5 MG per tablet Take 1 tablet by mouth Daily. 90 tablet 1   • mesalamine (PENTASA) 500 MG CR capsule TAKE 2 CAPSULES BY MOUTH ONCE DAILY 180 capsule 0   • Multiple Vitamin (MULTI VITAMIN DAILY PO) Take  by mouth daily.     • NON FORMULARY Tumeric 1000mg     • Omega-3 Fatty Acids (FISH OIL) 1000 MG capsule capsule Take 1 capsule by mouth.     • ondansetron (ZOFRAN) 4 MG tablet TAKE 1 TABLET BY MOUTH EVERY 6 HOURS AS NEEDED FOR NAUSEA 36 tablet 0   • pantoprazole (PROTONIX) 40 MG EC tablet Take 1 tablet by mouth Daily. 90 tablet 3   • STELARA 90 MG/ML solution prefilled syringe Injection      • ustekinumab (Stelara) 130 MG/26ML solution injection Administer STELARA 130mg IV     • vitamin B-12 (CYANOCOBALAMIN) 1000 MCG tablet Take  by mouth daily.     • [DISCONTINUED]  "JANUMET -1000 MG tablet TAKE ONE TABLET BY MOUTH ONCE DAILY 90 tablet 0     No current facility-administered medications on file prior to visit.        Objective   Vitals:    11/02/20 0832   BP: 134/78   BP Location: Left arm   Patient Position: Sitting   Cuff Size: Adult   Pulse: 65   SpO2: 98%   Weight: 131 kg (288 lb)   Height: 185.4 cm (73\")     Body mass index is 38 kg/m².    Physical Exam  Vitals signs and nursing note reviewed.   Constitutional:       Appearance: He is well-developed.   HENT:      Head: Normocephalic and atraumatic.   Neck:      Musculoskeletal: Neck supple.      Thyroid: No thyromegaly.      Vascular: No JVD.   Cardiovascular:      Rate and Rhythm: Normal rate and regular rhythm.      Heart sounds: Normal heart sounds. No murmur. No friction rub. No gallop.    Pulmonary:      Effort: Pulmonary effort is normal. No respiratory distress.      Breath sounds: Normal breath sounds. No wheezing or rales.   Abdominal:      General: Bowel sounds are normal. There is no distension.      Palpations: Abdomen is soft.      Tenderness: There is no abdominal tenderness. There is no guarding or rebound.   Skin:     General: Skin is warm and dry.   Neurological:      Mental Status: He is alert.   Psychiatric:         Behavior: Behavior normal.         Assessment/Plan   Diagnoses and all orders for this visit:    1. Wellness examination (Primary)    2. Type 2 diabetes mellitus without complication, without long-term current use of insulin (CMS/[facility])  -     Comprehensive Metabolic Panel  -     Lipid Panel  -     Hemoglobin A1c    3. Hyperuricemia  -     Comprehensive Metabolic Panel  -     CBC & Differential  -     Uric Acid    4. Benign essential hypertension  -     Comprehensive Metabolic Panel    5. Other hyperlipidemia  -     Lipid Panel    6. Crohn's disease of small intestine with intestinal obstruction (CMS/HCC)  -     Comprehensive Metabolic Panel  -     CBC & Differential    7. Flu vaccine " need  -     Fluarix Quad >6 Months ([id number])    due labs today  Counseled on diet and exercise       -Follow up: 6 months and prn

## 2020-11-02 NOTE — PATIENT INSTRUCTIONS
Calorie Counting for Weight Loss  Calories are units of energy. Your body needs a certain amount of calories from food to keep you going throughout the day. When you eat more calories than your body needs, your body stores the extra calories as fat. When you eat fewer calories than your body needs, your body burns fat to get the energy it needs.  Calorie counting means keeping track of how many calories you eat and drink each day. Calorie counting can be helpful if you need to lose weight. If you make sure to eat fewer calories than your body needs, you should lose weight. Ask your health care provider what a healthy weight is for you.  For calorie counting to work, you will need to eat the right number of calories in a day in order to lose a healthy amount of weight per week. A dietitian can help you determine how many calories you need in a day and will give you suggestions on how to reach your calorie goal.  · A healthy amount of weight to lose per week is usually 1-2 lb (0.5-0.9 kg). This usually means that your daily calorie intake should be reduced by 500-750 calories.  · Eating 1,200 - 1,500 calories per day can help most women lose weight.  · Eating 1,500 - 1,800 calories per day can help most men lose weight.  What is my plan?  My goal is to have __________ calories per day.  If I have this many calories per day, I should lose around __________ pounds per week.  What do I need to know about calorie counting?  In order to meet your daily calorie goal, you will need to:  · Find out how many calories are in each food you would like to eat. Try to do this before you eat.  · Decide how much of the food you plan to eat.  · Write down what you ate and how many calories it had. Doing this is called keeping a food log.  To successfully lose weight, it is important to balance calorie counting with a healthy lifestyle that includes regular activity. Aim for 150 minutes of moderate exercise (such as walking) or 75  minutes of vigorous exercise (such as running) each week.  Where do I find calorie information?    The number of calories in a food can be found on a Nutrition Facts label. If a food does not have a Nutrition Facts label, try to look up the calories online or ask your dietitian for help.  Remember that calories are listed per serving. If you choose to have more than one serving of a food, you will have to multiply the calories per serving by the amount of servings you plan to eat. For example, the label on a package of bread might say that a serving size is 1 slice and that there are 90 calories in a serving. If you eat 1 slice, you will have eaten 90 calories. If you eat 2 slices, you will have eaten 180 calories.  How do I keep a food log?  Immediately after each meal, record the following information in your food log:  · What you ate. Don't forget to include toppings, sauces, and other extras on the food.  · How much you ate. This can be measured in cups, ounces, or number of items.  · How many calories each food and drink had.  · The total number of calories in the meal.  Keep your food log near you, such as in a small notebook in your pocket, or use a mobile segundo or website. Some programs will calculate calories for you and show you how many calories you have left for the day to meet your goal.  What are some calorie counting tips?    · Use your calories on foods and drinks that will fill you up and not leave you hungry:  ? Some examples of foods that fill you up are nuts and nut butters, vegetables, lean proteins, and high-fiber foods like whole grains. High-fiber foods are foods with more than 5 g fiber per serving.  ? Drinks such as sodas, specialty coffee drinks, alcohol, and juices have a lot of calories, yet do not fill you up.  · Eat nutritious foods and avoid empty calories. Empty calories are calories you get from foods or beverages that do not have many vitamins or protein, such as candy, sweets, and  "soda. It is better to have a nutritious high-calorie food (such as an avocado) than a food with few nutrients (such as a bag of chips).  · Know how many calories are in the foods you eat most often. This will help you calculate calorie counts faster.  · Pay attention to calories in drinks. Low-calorie drinks include water and unsweetened drinks.  · Pay attention to nutrition labels for \"low fat\" or \"fat free\" foods. These foods sometimes have the same amount of calories or more calories than the full fat versions. They also often have added sugar, starch, or salt, to make up for flavor that was removed with the fat.  · Find a way of tracking calories that works for you. Get creative. Try different apps or programs if writing down calories does not work for you.  What are some portion control tips?  · Know how many calories are in a serving. This will help you know how many servings of a certain food you can have.  · Use a measuring cup to measure serving sizes. You could also try weighing out portions on a kitchen scale. With time, you will be able to estimate serving sizes for some foods.  · Take some time to put servings of different foods on your favorite plates, bowls, and cups so you know what a serving looks like.  · Try not to eat straight from a bag or box. Doing this can lead to overeating. Put the amount you would like to eat in a cup or on a plate to make sure you are eating the right portion.  · Use smaller plates, glasses, and bowls to prevent overeating.  · Try not to multitask (for example, watch TV or use your computer) while eating. If it is time to eat, sit down at a table and enjoy your food. This will help you to know when you are full. It will also help you to be aware of what you are eating and how much you are eating.  What are tips for following this plan?  Reading food labels  · Check the calorie count compared to the serving size. The serving size may be smaller than what you are used to " "eating.  · Check the source of the calories. Make sure the food you are eating is high in vitamins and protein and low in saturated and trans fats.  Shopping  · Read nutrition labels while you shop. This will help you make healthy decisions before you decide to purchase your food.  · Make a grocery list and stick to it.  Cooking  · Try to cook your favorite foods in a healthier way. For example, try baking instead of frying.  · Use low-fat dairy products.  Meal planning  · Use more fruits and vegetables. Half of your plate should be fruits and vegetables.  · Include lean proteins like poultry and fish.  How do I count calories when eating out?  · Ask for smaller portion sizes.  · Consider sharing an entree and sides instead of getting your own entree.  · If you get your own entree, eat only half. Ask for a box at the beginning of your meal and put the rest of your entree in it so you are not tempted to eat it.  · If calories are listed on the menu, choose the lower calorie options.  · Choose dishes that include vegetables, fruits, whole grains, low-fat dairy products, and lean protein.  · Choose items that are boiled, broiled, grilled, or steamed. Stay away from items that are buttered, battered, fried, or served with cream sauce. Items labeled \"crispy\" are usually fried, unless stated otherwise.  · Choose water, low-fat milk, unsweetened iced tea, or other drinks without added sugar. If you want an alcoholic beverage, choose a lower calorie option such as a glass of wine or light beer.  · Ask for dressings, sauces, and syrups on the side. These are usually high in calories, so you should limit the amount you eat.  · If you want a salad, choose a garden salad and ask for grilled meats. Avoid extra toppings like jason, cheese, or fried items. Ask for the dressing on the side, or ask for olive oil and vinegar or lemon to use as dressing.  · Estimate how many servings of a food you are given. For example, a serving of " cooked rice is ½ cup or about the size of half a baseball. Knowing serving sizes will help you be aware of how much food you are eating at restaurants. The list below tells you how big or small some common portion sizes are based on everyday objects:  ? 1 oz--4 stacked dice.  ? 3 oz--1 deck of cards.  ? 1 tsp--1 die.  ? 1 Tbsp--½ a ping-pong ball.  ? 2 Tbsp--1 ping-pong ball.  ? ½ cup--½ baseball.  ? 1 cup--1 baseball.  Summary  · Calorie counting means keeping track of how many calories you eat and drink each day. If you eat fewer calories than your body needs, you should lose weight.  · A healthy amount of weight to lose per week is usually 1-2 lb (0.5-0.9 kg). This usually means reducing your daily calorie intake by 500-750 calories.  · The number of calories in a food can be found on a Nutrition Facts label. If a food does not have a Nutrition Facts label, try to look up the calories online or ask your dietitian for help.  · Use your calories on foods and drinks that will fill you up, and not on foods and drinks that will leave you hungry.  · Use smaller plates, glasses, and bowls to prevent overeating.  This information is not intended to replace advice given to you by your health care provider. Make sure you discuss any questions you have with your health care provider.  Document Released: 12/18/2006 Document Revised: 09/06/2019 Document Reviewed: 11/17/2017  [employer] Patient Education © 2020 [employer]      Exercising to Lose Weight  Exercise is structured, repetitive physical activity to improve fitness and health. Getting regular exercise is important for everyone. It is especially important if you are overweight. Being overweight increases your risk of heart disease, stroke, diabetes, high blood pressure, and several types of cancer. Reducing your calorie intake and exercising can help you lose weight.  Exercise is usually categorized as moderate or vigorous intensity. To lose weight, most people need  to do a certain amount of moderate-intensity or vigorous-intensity exercise each week.  Moderate-intensity exercise    Moderate-intensity exercise is any activity that gets you moving enough to burn at least three times more energy (calories) than if you were sitting.  Examples of moderate exercise include:  · Walking a mile in 15 minutes.  · Doing light yard work.  · Biking at an easy pace.  Most people should get at least 150 minutes (2 hours and 30 minutes) a week of moderate-intensity exercise to maintain their body weight.  Vigorous-intensity exercise  Vigorous-intensity exercise is any activity that gets you moving enough to burn at least six times more calories than if you were sitting. When you exercise at this intensity, you should be working hard enough that you are not able to carry on a conversation.  Examples of vigorous exercise include:  · Running.  · Playing a team sport, such as football, basketball, and soccer.  · Jumping rope.  Most people should get at least 75 minutes (1 hour and 15 minutes) a week of vigorous-intensity exercise to maintain their body weight.  How can exercise affect me?  When you exercise enough to burn more calories than you eat, you lose weight. Exercise also reduces body fat and builds muscle. The more muscle you have, the more calories you burn. Exercise also:  · Improves mood.  · Reduces stress and tension.  · Improves your overall fitness, flexibility, and endurance.  · Increases bone strength.  The amount of exercise you need to lose weight depends on:  · Your age.  · The type of exercise.  · Any health conditions you have.  · Your overall physical ability.  Talk to your health care provider about how much exercise you need and what types of activities are safe for you.  What actions can I take to lose weight?  Nutrition    · Make changes to your diet as told by your health care provider or diet and nutrition specialist (dietitian). This may include:  ? Eating fewer  calories.  ? Eating more protein.  ? Eating less unhealthy fats.  ? Eating a diet that includes fresh fruits and vegetables, whole grains, low-fat dairy products, and lean protein.  ? Avoiding foods with added fat, salt, and sugar.  · Drink plenty of water while you exercise to prevent dehydration or heat stroke.  Activity  · Choose an activity that you enjoy and set realistic goals. Your health care provider can help you make an exercise plan that works for you.  · Exercise at a moderate or vigorous intensity most days of the week.  ? The intensity of exercise may vary from person to person. You can tell how intense a workout is for you by paying attention to your breathing and heartbeat. Most people will notice their breathing and heartbeat get faster with more intense exercise.  · Do resistance training twice each week, such as:  ? Push-ups.  ? Sit-ups.  ? Lifting weights.  ? Using resistance bands.  · Getting short amounts of exercise can be just as helpful as long structured periods of exercise. If you have trouble finding time to exercise, try to include exercise in your daily routine.  ? Get up, stretch, and walk around every 30 minutes throughout the day.  ? Go for a walk during your lunch break.  ? Park your car farther away from your destination.  ? If you take public transportation, get off one stop early and walk the rest of the way.  ? Make phone calls while standing up and walking around.  ? Take the stairs instead of elevators or escalators.  · Wear comfortable clothes and shoes with good support.  · Do not exercise so much that you hurt yourself, feel dizzy, or get very short of breath.  Where to find more information  · [place] Department of Health and Human Services: www.hhs.gov  · Centers for Disease Control and Prevention (CDC): www.cdc.gov  Contact a health care provider:  · Before starting a new exercise program.  · If you have questions or concerns about your weight.  · If you have a medical  problem that keeps you from exercising.  Get help right away if you have any of the following while exercising:  · Injury.  · Dizziness.  · Difficulty breathing or shortness of breath that does not go away when you stop exercising.  · Chest pain.  · Rapid heartbeat.  Summary  · Being overweight increases your risk of heart disease, stroke, diabetes, high blood pressure, and several types of cancer.  · Losing weight happens when you burn more calories than you eat.  · Reducing the amount of calories you eat in addition to getting regular moderate or vigorous exercise each week helps you lose weight.  This information is not intended to replace advice given to you by your health care provider. Make sure you discuss any questions you have with your health care provider.  Document Released: 01/20/2012 Document Revised: 12/31/2018 Document Reviewed: 12/31/2018  [employer] Patient Education © 2020 [employer]

## 2020-11-03 LAB
ALBUMIN SERPL-MCNC: 4.5 G/DL (ref 3.5–5.2)
ALBUMIN/CREAT UR: 22 MG/G CREAT (ref 0–29)
ALBUMIN/GLOB SERPL: 1.9 G/DL
ALP SERPL-CCNC: 74 U/L (ref 39–117)
ALT SERPL-CCNC: 76 U/L (ref 1–41)
AST SERPL-CCNC: 72 U/L (ref 1–40)
BASOPHILS # BLD AUTO: 0.04 10*3/MM3 (ref 0–0.2)
BASOPHILS NFR BLD AUTO: 0.4 % (ref 0–1.5)
BILIRUB SERPL-MCNC: 0.6 MG/DL (ref 0–1.2)
BUN SERPL-MCNC: 18 MG/DL (ref 8–23)
BUN/CREAT SERPL: 16.5 (ref 7–25)
CALCIUM SERPL-MCNC: 9.9 MG/DL (ref 8.6–10.5)
CHLORIDE SERPL-SCNC: 97 MMOL/L (ref 98–107)
CHOLEST SERPL-MCNC: 120 MG/DL (ref 0–200)
CO2 SERPL-SCNC: 29.3 MMOL/L (ref 22–29)
CREAT SERPL-MCNC: 1.09 MG/DL (ref 0.76–1.27)
CREAT UR-MCNC: 116.2 MG/DL
EOSINOPHIL # BLD AUTO: 0.13 10*3/MM3 (ref 0–0.4)
EOSINOPHIL NFR BLD AUTO: 1.4 % (ref 0.3–6.2)
ERYTHROCYTE [DISTWIDTH] IN BLOOD BY AUTOMATED COUNT: 14.4 % (ref 12.3–15.4)
GLOBULIN SER CALC-MCNC: 2.4 GM/DL
GLUCOSE SERPL-MCNC: 153 MG/DL (ref 65–99)
HBA1C MFR BLD: 7.9 % (ref 4.8–5.6)
HCT VFR BLD AUTO: 44.1 % (ref 37.5–51)
HDLC SERPL-MCNC: 31 MG/DL (ref 40–60)
HGB BLD-MCNC: 14.3 G/DL (ref 13–17.7)
IMM GRANULOCYTES # BLD AUTO: 0.04 10*3/MM3 (ref 0–0.05)
IMM GRANULOCYTES NFR BLD AUTO: 0.4 % (ref 0–0.5)
LDLC SERPL CALC-MCNC: 65 MG/DL (ref 0–100)
LYMPHOCYTES # BLD AUTO: 1.14 10*3/MM3 (ref 0.7–3.1)
LYMPHOCYTES NFR BLD AUTO: 12.1 % (ref 19.6–45.3)
MCH RBC QN AUTO: 27.2 PG (ref 26.6–33)
MCHC RBC AUTO-ENTMCNC: 32.4 G/DL (ref 31.5–35.7)
MCV RBC AUTO: 83.8 FL (ref 79–97)
MICROALBUMIN UR-MCNC: 25.4 UG/ML
MONOCYTES # BLD AUTO: 0.56 10*3/MM3 (ref 0.1–0.9)
MONOCYTES NFR BLD AUTO: 6 % (ref 5–12)
NEUTROPHILS # BLD AUTO: 7.49 10*3/MM3 (ref 1.7–7)
NEUTROPHILS NFR BLD AUTO: 79.7 % (ref 42.7–76)
NRBC BLD AUTO-RTO: 0 /100 WBC (ref 0–0.2)
PLATELET # BLD AUTO: 198 10*3/MM3 (ref 140–450)
POTASSIUM SERPL-SCNC: 5 MMOL/L (ref 3.5–5.2)
PROT SERPL-MCNC: 6.9 G/DL (ref 6–8.5)
RBC # BLD AUTO: 5.26 10*6/MM3 (ref 4.14–5.8)
SODIUM SERPL-SCNC: 137 MMOL/L (ref 136–145)
TRIGL SERPL-MCNC: 137 MG/DL (ref 0–150)
URATE SERPL-MCNC: 5.5 MG/DL (ref 3.4–7)
VLDLC SERPL CALC-MCNC: 24 MG/DL (ref 5–40)
WBC # BLD AUTO: 9.4 10*3/MM3 (ref 3.4–10.8)

## 2020-11-11 NOTE — PROGRESS NOTES
Call and mail copy of results to patient.  A1C went up a little, work on diet and exercise  Cholesterol controlled  Mild liver enzyme elevation, stable.  Work on weight loss.

## 2020-12-04 ENCOUNTER — HOSPITAL ENCOUNTER (OUTPATIENT)
Dept: NUCLEAR MEDICINE | Facility: HOSPITAL | Age: 60
Discharge: HOME | End: 2020-12-04

## 2020-12-04 DIAGNOSIS — K50.012 CROHN'S DISEASE OF SMALL INTESTINE WITH INTESTINAL OBSTRUCTION (HCC): ICD-10-CM

## 2020-12-04 PROCEDURE — A9541 TC99M SULFUR COLLOID: HCPCS | Performed by: INTERNAL MEDICINE

## 2020-12-04 PROCEDURE — 78264 GASTRIC EMPTYING IMG STUDY: CPT

## 2020-12-04 PROCEDURE — 0 TECHNETIUM SULFUR COLLOID: Performed by: INTERNAL MEDICINE

## 2020-12-04 RX ADMIN — TECHNETIUM TC 99M SULFUR COLLOID 1 DOSE: KIT at 06:55

## 2020-12-08 ENCOUNTER — TELEPHONE (OUTPATIENT)
Dept: GASTROENTEROLOGY | Facility: CLINIC | Age: 60
End: 2020-12-08

## 2020-12-08 DIAGNOSIS — R93.3 ABNORMAL FINDING ON GI TRACT IMAGING: Primary | ICD-10-CM

## 2020-12-08 DIAGNOSIS — R11.2 INTRACTABLE VOMITING WITH NAUSEA, UNSPECIFIED VOMITING TYPE: ICD-10-CM

## 2020-12-08 NOTE — TELEPHONE ENCOUNTER
Gastric emptying study shows abnormal retention of gastric contents at 4 hours.  Recommend upper GI study with small bowel follow-through to further evaluate for any areas of obstruction.  I suspect this is more his stomach emptying slowly but because the study was so abnormal I think we need to reevaluate his upper GI tract-if he is agreeable I will order

## 2020-12-17 NOTE — TELEPHONE ENCOUNTER
Call to pt.  Advise per DR Calle note.  Verb understanding.     Currently in [place] - will be back in 1 month.  Agreeable to UGI/sbft.  Advise that [facility] will contact to arrange.     Message to Dr Calle

## 2020-12-24 ENCOUNTER — TRANSCRIBE ORDERS (OUTPATIENT)
Dept: SLEEP MEDICINE | Facility: HOSPITAL | Age: 60
End: 2020-12-24

## 2020-12-24 DIAGNOSIS — Z01.818 OTHER SPECIFIED PRE-OPERATIVE EXAMINATION: Primary | ICD-10-CM

## 2021-01-11 RX ORDER — ATORVASTATIN CALCIUM 40 MG/1
TABLET, FILM COATED ORAL
Qty: 90 TABLET | Refills: 3 | Status: SHIPPED | OUTPATIENT
Start: 2021-01-11 | End: 2022-01-13 | Stop reason: SDUPTHER

## 2021-01-11 RX ORDER — SITAGLIPTIN AND METFORMIN HYDROCHLORIDE 1000; 100 MG/1; MG/1
TABLET, FILM COATED, EXTENDED RELEASE ORAL
Qty: 90 TABLET | Refills: 3 | Status: SHIPPED | OUTPATIENT
Start: 2021-01-11 | End: 2021-11-08

## 2021-01-11 RX ORDER — LISINOPRIL AND HYDROCHLOROTHIAZIDE 20; 12.5 MG/1; MG/1
TABLET ORAL
Qty: 90 TABLET | Refills: 3 | Status: SHIPPED | OUTPATIENT
Start: 2021-01-11 | End: 2022-01-13 | Stop reason: SDUPTHER

## 2021-01-11 RX ORDER — ALLOPURINOL 300 MG/1
TABLET ORAL
Qty: 90 TABLET | Refills: 3 | Status: SHIPPED | OUTPATIENT
Start: 2021-01-11 | End: 2022-01-13 | Stop reason: SDUPTHER

## 2021-01-11 RX ORDER — DUTASTERIDE 0.5 MG/1
CAPSULE, LIQUID FILLED ORAL
Qty: 90 CAPSULE | Refills: 3 | Status: SHIPPED | OUTPATIENT
Start: 2021-01-11 | End: 2022-01-13 | Stop reason: SDUPTHER

## 2021-01-25 ENCOUNTER — LAB (OUTPATIENT)
Dept: LAB | Facility: HOSPITAL | Age: 61
End: 2021-01-25

## 2021-01-25 DIAGNOSIS — Z01.818 OTHER SPECIFIED PRE-OPERATIVE EXAMINATION: ICD-10-CM

## 2021-01-25 PROCEDURE — C9803 HOPD COVID-19 SPEC COLLECT: HCPCS

## 2021-01-25 PROCEDURE — U0004 COV-19 TEST NON-CDC HGH THRU: HCPCS

## 2021-01-26 LAB — SARS-COV-2 RNA RESP QL NAA+PROBE: NOT DETECTED

## 2021-01-27 ENCOUNTER — HOSPITAL ENCOUNTER (OUTPATIENT)
Dept: GENERAL RADIOLOGY | Facility: HOSPITAL | Age: 61
Discharge: HOME | End: 2021-01-27
Admitting: INTERNAL MEDICINE

## 2021-01-27 DIAGNOSIS — R93.3 ABNORMAL FINDING ON GI TRACT IMAGING: ICD-10-CM

## 2021-01-27 DIAGNOSIS — R11.2 INTRACTABLE VOMITING WITH NAUSEA, UNSPECIFIED VOMITING TYPE: ICD-10-CM

## 2021-01-27 PROCEDURE — 74248 X-RAY SM INT F-THRU STD: CPT

## 2021-01-27 PROCEDURE — 74246 X-RAY XM UPR GI TRC 2CNTRST: CPT

## 2021-01-27 RX ADMIN — BARIUM SULFATE 366 ML: 960 POWDER, FOR SUSPENSION ORAL at 11:06

## 2021-01-27 RX ADMIN — ANTACID/ANTIFLATULENT 1 TABLET: 380; 550; 10; 10 GRANULE, EFFERVESCENT ORAL at 11:05

## 2021-01-27 RX ADMIN — BARIUM SULFATE 135 ML: 980 POWDER, FOR SUSPENSION ORAL at 11:06

## 2021-02-01 ENCOUNTER — TELEPHONE (OUTPATIENT)
Dept: GASTROENTEROLOGY | Facility: CLINIC | Age: 61
End: 2021-02-01

## 2021-02-01 NOTE — TELEPHONE ENCOUNTER
Some evidence of esophageal sluggishness (dysmotility) noted.  Stomach and small bowel were normal.  No obstruction seen   Dapsone Counseling: I discussed with the patient the risks of dapsone including but not limited to hemolytic anemia, agranulocytosis, rashes, methemoglobinemia, kidney failure, peripheral neuropathy, headaches, GI upset, and liver toxicity.  Patients who start dapsone require monitoring including baseline LFTs and weekly CBCs for the first month, then every month thereafter.  The patient verbalized understanding of the proper use and possible adverse effects of dapsone.  All of the patient's questions and concerns were addressed.

## 2021-02-04 NOTE — TELEPHONE ENCOUNTER
Returned pt's call and advised of Dr Calle's note. He verb understanding. Pt reports that his stomach is still staying upset and is asking for any suggestions on what he can do. ADvised will send message to Dr Calle.

## 2021-02-09 NOTE — TELEPHONE ENCOUNTER
I think given his gastric emptying study results showing slow gastric emptying it would be very important for him to eat small low-fat low fiber meals.  When his stomach is upset he should revert to liquids only.  Schedule office follow-up next available

## 2021-02-10 NOTE — TELEPHONE ENCOUNTER
"Call to pt.  Advise per Dr Calle note.  Verb understanding.      States has quit taking daily pantoprazole and \"doing great\".   Currently in [place].  Will call back to make f/u appt.   "

## 2021-02-19 ENCOUNTER — TELEPHONE (OUTPATIENT)
Dept: GASTROENTEROLOGY | Facility: CLINIC | Age: 61
End: 2021-02-19

## 2021-02-19 NOTE — TELEPHONE ENCOUNTER
Faxed request received from [facility] for stelara 45 mg/0.5 ml syringe.    Call to pt.  Verifies that does want filled thru [facility]. Pharmacy info updated.  Request to DR Calle.

## 2021-02-22 RX ORDER — USTEKINUMAB 90 MG/ML
90 INJECTION, SOLUTION SUBCUTANEOUS TAKE AS DIRECTED
Qty: 1 ML | Refills: 6 | Status: SHIPPED | OUTPATIENT
Start: 2021-02-22 | End: 2022-03-29

## 2021-05-05 ENCOUNTER — OFFICE VISIT (OUTPATIENT)
Dept: FAMILY MEDICINE CLINIC | Facility: CLINIC | Age: 61
End: 2021-05-05

## 2021-05-05 VITALS
WEIGHT: 281 LBS | HEART RATE: 80 BPM | OXYGEN SATURATION: 98 % | DIASTOLIC BLOOD PRESSURE: 68 MMHG | HEIGHT: 73 IN | BODY MASS INDEX: 37.24 KG/M2 | SYSTOLIC BLOOD PRESSURE: 112 MMHG

## 2021-05-05 DIAGNOSIS — K50.012 CROHN'S DISEASE OF SMALL INTESTINE WITH INTESTINAL OBSTRUCTION (HCC): ICD-10-CM

## 2021-05-05 DIAGNOSIS — K42.9 UMBILICAL HERNIA WITHOUT OBSTRUCTION AND WITHOUT GANGRENE: ICD-10-CM

## 2021-05-05 DIAGNOSIS — E11.9 ENCOUNTER FOR DIABETIC FOOT EXAM (HCC): ICD-10-CM

## 2021-05-05 DIAGNOSIS — E79.0 HYPERURICEMIA: ICD-10-CM

## 2021-05-05 DIAGNOSIS — B35.1 ONYCHOMYCOSIS DUE TO DERMATOPHYTE: ICD-10-CM

## 2021-05-05 DIAGNOSIS — E55.9 VITAMIN D DEFICIENCY: ICD-10-CM

## 2021-05-05 DIAGNOSIS — E11.42 DIABETIC PERIPHERAL NEUROPATHY (HCC): ICD-10-CM

## 2021-05-05 DIAGNOSIS — E78.49 OTHER HYPERLIPIDEMIA: ICD-10-CM

## 2021-05-05 DIAGNOSIS — I10 BENIGN ESSENTIAL HYPERTENSION: ICD-10-CM

## 2021-05-05 DIAGNOSIS — E11.9 TYPE 2 DIABETES MELLITUS WITHOUT COMPLICATION, WITHOUT LONG-TERM CURRENT USE OF INSULIN (HCC): Primary | ICD-10-CM

## 2021-05-05 PROCEDURE — 99214 OFFICE O/P EST MOD 30 MIN: CPT | Performed by: FAMILY MEDICINE

## 2021-05-05 RX ORDER — GABAPENTIN 300 MG/1
300 CAPSULE ORAL DAILY
COMMUNITY

## 2021-05-05 RX ORDER — CLOTRIMAZOLE 1 %
CREAM (GRAM) TOPICAL 2 TIMES DAILY
Qty: 60 G | Refills: 5 | Status: SHIPPED | OUTPATIENT
Start: 2021-05-05 | End: 2021-11-08

## 2021-05-05 RX ORDER — TERBINAFINE HYDROCHLORIDE 250 MG/1
250 TABLET ORAL DAILY
Qty: 90 TABLET | Refills: 0 | Status: SHIPPED | OUTPATIENT
Start: 2021-05-05 | End: 2022-05-12
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
## 2021-05-31 ENCOUNTER — TELEPHONE (OUTPATIENT)
Dept: FAMILY MEDICINE CLINIC | Facility: CLINIC | Age: 61
End: 2021-05-31

## 2021-11-08 ENCOUNTER — OFFICE VISIT (OUTPATIENT)
Dept: FAMILY MEDICINE CLINIC | Facility: CLINIC | Age: 61
End: 2021-11-08

## 2021-11-08 VITALS
BODY MASS INDEX: 30.35 KG/M2 | OXYGEN SATURATION: 99 % | DIASTOLIC BLOOD PRESSURE: 72 MMHG | HEIGHT: 73 IN | HEART RATE: 59 BPM | SYSTOLIC BLOOD PRESSURE: 118 MMHG | WEIGHT: 229 LBS

## 2021-11-08 DIAGNOSIS — E55.9 VITAMIN D DEFICIENCY: ICD-10-CM

## 2021-11-08 DIAGNOSIS — Z00.00 WELLNESS EXAMINATION: Primary | ICD-10-CM

## 2021-11-08 DIAGNOSIS — N52.8 OTHER MALE ERECTILE DYSFUNCTION: ICD-10-CM

## 2021-11-08 DIAGNOSIS — E11.9 TYPE 2 DIABETES MELLITUS WITHOUT COMPLICATION, WITHOUT LONG-TERM CURRENT USE OF INSULIN (HCC): ICD-10-CM

## 2021-11-08 DIAGNOSIS — I10 BENIGN ESSENTIAL HYPERTENSION: ICD-10-CM

## 2021-11-08 DIAGNOSIS — K50.00 CROHN'S DISEASE OF SMALL INTESTINE WITHOUT COMPLICATION (HCC): ICD-10-CM

## 2021-11-08 DIAGNOSIS — E78.5 DYSLIPIDEMIA: ICD-10-CM

## 2021-11-08 DIAGNOSIS — E79.0 HYPERURICEMIA: ICD-10-CM

## 2021-11-08 PROCEDURE — 99396 PREV VISIT EST AGE 40-64: CPT | Performed by: FAMILY MEDICINE

## 2021-11-08 RX ORDER — METFORMIN HYDROCHLORIDE 500 MG/1
1000 TABLET, EXTENDED RELEASE ORAL
Qty: 180 TABLET | Refills: 1
Start: 2021-11-08 | End: 2022-01-13 | Stop reason: SDUPTHER

## 2021-11-08 RX ORDER — SILDENAFIL 100 MG/1
100 TABLET, FILM COATED ORAL DAILY PRN
Qty: 30 TABLET | Refills: 5 | Status: SHIPPED | OUTPATIENT
Start: 2021-11-08

## 2021-11-08 NOTE — PROGRESS NOTES
Subjective   Ramiro Tovar JR is a 61 y.o. male. Presents today for   Chief Complaint   Patient presents with   • Annual Exam     wellness   • Hyperlipidemia   • Diabetes   • Hypertension       History of Present Illness  Patient here for wellness exam;  Patient with dm2, htn,hld doing well;  Lost 52 lbs intentionally;  Has crohns on stelara and well controlled;  Having ED would like viagra;  Has hyperuricemia and low vitamin D due for recheck;  Bs's now after weight loss 80s to 100s;  Exercising regularly;       Review of Systems   Respiratory: Negative for shortness of breath.    Cardiovascular: Negative for chest pain and palpitations.   Gastrointestinal: Negative for abdominal pain.       Patient Active Problem List   Diagnosis   • Hyperuricemia   • Benign essential hypertension   • Benign prostatic hypertrophy without urinary obstruction   • Chronic pain syndrome   • Crohn's disease of colon (HCC)   • Dyslipidemia   • Gout   • Weakness of hand   • Knee pain   • Low back pain   • Peripheral neuropathy   • Type 2 diabetes mellitus without complication, without long-term current use of insulin (HCC)   • Vitamin D deficiency   • Crohn's disease (HCC)   • History of colectomy   • Hyperlipidemia   • Calculus of kidney   • Adult body mass index greater than 30   • Small bowel obstruction (HCC)   • Gastroesophageal reflux disease   • Crohn's disease of small intestine with intestinal obstruction (HCC)       Social History     Socioeconomic History   • Marital status:    Tobacco Use   • Smoking status: Never Smoker   • Smokeless tobacco: Never Used   Substance and Sexual Activity   • Alcohol use: Yes     Comment: RARE   • Drug use: No   • Sexual activity: Defer       Allergies   Allergen Reactions   • Sulfa Antibiotics Rash   • Hydromorphone      Affects his chron's disease doesn't want this drug   • Hydromorphone Hcl      Affects his chron's disease doesn't want this drug. Makes him sick and does not take away  pain   • Morphine And Related Nausea And Vomiting     Has problems with his chron's  Has problems with his chron's. Makes him sick and does not take away pain; makes crohn's worse   • Penicillins Rash     UNKNOWN-CHILDHOOD       Current Outpatient Medications on File Prior to Visit   Medication Sig Dispense Refill   • allopurinol (ZYLOPRIM) 300 MG tablet Take 1 tablet by mouth once daily 90 tablet 3   • aspirin 81 MG tablet Take  by mouth daily.     • atorvastatin (LIPITOR) 40 MG tablet Take 1 tablet by mouth once daily 90 tablet 3   • colchicine 0.6 MG tablet Take 1 tablet by mouth Daily. (Patient taking differently: Take 0.6 mg by mouth As Needed.) 30 tablet 6   • dutasteride (AVODART) 0.5 MG capsule Take 1 capsule by mouth once daily 90 capsule 3   • gabapentin (NEURONTIN) 300 MG capsule Take 300 mg by mouth Daily.     • HYDROcodone-acetaminophen (NORCO)  MG per tablet Take 1 tablet by mouth 3 (Three) Times a Day As Needed. for pain  0   • Janumet -1000 MG tablet Take 1 tablet by mouth once daily 90 tablet 3   • lisinopril-hydrochlorothiazide (PRINZIDE,ZESTORETIC) 20-12.5 MG per tablet Take 1 tablet by mouth once daily 90 tablet 3   • Multiple Vitamin (MULTI VITAMIN DAILY PO) Take  by mouth daily.     • NON FORMULARY Tumeric 1000mg     • Omega-3 Fatty Acids (FISH OIL) 1000 MG capsule capsule Take 1 capsule by mouth.     • ondansetron (ZOFRAN) 4 MG tablet TAKE 1 TABLET BY MOUTH EVERY 6 HOURS AS NEEDED FOR NAUSEA 36 tablet 0   • pantoprazole (PROTONIX) 40 MG EC tablet Take 1 tablet by mouth Daily. 90 tablet 3   • Stelara 90 MG/ML solution prefilled syringe Injection Inject 90 mg under the skin into the appropriate area as directed Take As Directed. Inject every 8 weeks 1 mL 6   • terbinafine (LamISIL) 250 MG tablet Take 1 tablet by mouth Daily. 90 tablet 0   • ustekinumab (Stelara) 130 MG/26ML solution injection Administer STELARA 130mg IV     • vitamin B-12 (CYANOCOBALAMIN) 1000 MCG tablet Take  by  "mouth daily.     • [DISCONTINUED] clotrimazole (LOTRIMIN) 1 % cream Apply  topically to the appropriate area as directed 2 (Two) Times a Day. 60 g 5   • [DISCONTINUED] JANUMET -1000 MG tablet TAKE ONE TABLET BY MOUTH ONCE DAILY 90 tablet 0   • [DISCONTINUED] mesalamine (PENTASA) 500 MG CR capsule TAKE 2 CAPSULES BY MOUTH ONCE DAILY 180 capsule 0     No current facility-administered medications on file prior to visit.       Objective   Vitals:    11/08/21 0836   BP: 118/72   Pulse: 59   SpO2: 99%   Weight: 104 kg (229 lb)   Height: 185.4 cm (73\")     Body mass index is 30.21 kg/m².    Physical Exam  Vitals and nursing note reviewed.   Constitutional:       Appearance: He is well-developed.   HENT:      Head: Normocephalic and atraumatic.   Neck:      Thyroid: No thyromegaly.      Vascular: No JVD.   Cardiovascular:      Rate and Rhythm: Normal rate and regular rhythm.      Heart sounds: Normal heart sounds. No murmur heard.  No friction rub. No gallop.    Pulmonary:      Effort: Pulmonary effort is normal. No respiratory distress.      Breath sounds: Normal breath sounds. No wheezing or rales.   Abdominal:      General: Bowel sounds are normal. There is no distension.      Palpations: Abdomen is soft.      Tenderness: There is no abdominal tenderness. There is no guarding or rebound.   Musculoskeletal:      Cervical back: Neck supple.   Skin:     General: Skin is warm and dry.   Neurological:      Mental Status: He is alert.   Psychiatric:         Behavior: Behavior normal.         Assessment/Plan   Diagnoses and all orders for this visit:    1. Wellness examination (Primary)    2. Type 2 diabetes mellitus without complication, without long-term current use of insulin ([facility])  -     Comprehensive Metabolic Panel  -     Hemoglobin A1c  -     Lipid Panel  -     Microalbumin / Creatinine Urine Ratio - Urine, Clean Catch  -     metFORMIN ER (GLUCOPHAGE-XR) 500 MG 24 hr tablet; Take 2 tablets by mouth Daily With " Breakfast.  Dispense: 180 tablet; Refill: 1    3. Benign essential hypertension  -     Comprehensive Metabolic Panel    4. Dyslipidemia  -     Lipid Panel    5. Vitamin D deficiency  -     Vitamin D 25 Hydroxy    6. Crohn's disease of small intestine without complication (HCC)  -     Comprehensive Metabolic Panel    7. Hyperuricemia  -     Uric Acid    8. Other male erectile dysfunction  -     sildenafil (Viagra) 100 MG tablet; Take 1 tablet by mouth Daily As Needed for Erectile Dysfunction.  Dispense: 30 tablet; Refill: 5    due for labs  counsele don diet and exercise  Doing great, lost 52 lbs  In January - drop janumet xr and go to metformin er only         -Follow up: 6 months and prn

## 2021-11-08 NOTE — PATIENT INSTRUCTIONS

## 2021-11-09 LAB
25(OH)D3+25(OH)D2 SERPL-MCNC: 55.1 NG/ML (ref 30–100)
ALBUMIN SERPL-MCNC: 4.7 G/DL (ref 3.8–4.8)
ALBUMIN/CREAT UR: 19 MG/G CREAT (ref 0–29)
ALBUMIN/GLOB SERPL: 2 {RATIO} (ref 1.2–2.2)
ALP SERPL-CCNC: 72 IU/L (ref 44–121)
ALT SERPL-CCNC: 27 IU/L (ref 0–44)
AST SERPL-CCNC: 28 IU/L (ref 0–40)
BILIRUB SERPL-MCNC: 0.5 MG/DL (ref 0–1.2)
BUN SERPL-MCNC: 17 MG/DL (ref 8–27)
BUN/CREAT SERPL: 20 (ref 10–24)
CALCIUM SERPL-MCNC: 10.2 MG/DL (ref 8.6–10.2)
CHLORIDE SERPL-SCNC: 101 MMOL/L (ref 96–106)
CHOLEST SERPL-MCNC: 149 MG/DL (ref 100–199)
CO2 SERPL-SCNC: 27 MMOL/L (ref 20–29)
CREAT SERPL-MCNC: 0.84 MG/DL (ref 0.76–1.27)
CREAT UR-MCNC: 74.9 MG/DL
GLOBULIN SER CALC-MCNC: 2.3 G/DL (ref 1.5–4.5)
GLUCOSE SERPL-MCNC: 100 MG/DL (ref 65–99)
HBA1C MFR BLD: 5.5 % (ref 4.8–5.6)
HDLC SERPL-MCNC: 38 MG/DL
LDLC SERPL CALC-MCNC: 96 MG/DL (ref 0–99)
MICROALBUMIN UR-MCNC: 14 UG/ML
POTASSIUM SERPL-SCNC: 4.4 MMOL/L (ref 3.5–5.2)
PROT SERPL-MCNC: 7 G/DL (ref 6–8.5)
SODIUM SERPL-SCNC: 141 MMOL/L (ref 134–144)
TRIGL SERPL-MCNC: 80 MG/DL (ref 0–149)
URATE SERPL-MCNC: 5.5 MG/DL (ref 3.8–8.4)
VLDLC SERPL CALC-MCNC: 15 MG/DL (ref 5–40)

## 2021-11-13 NOTE — PROGRESS NOTES
Call results to patient.  Liver enzymes normalized  Kidney function normal  Normalized a1c  Cholesterol well controlled

## 2022-01-13 DIAGNOSIS — E11.9 TYPE 2 DIABETES MELLITUS WITHOUT COMPLICATION, WITHOUT LONG-TERM CURRENT USE OF INSULIN: ICD-10-CM

## 2022-01-13 RX ORDER — METFORMIN HYDROCHLORIDE 500 MG/1
1000 TABLET, EXTENDED RELEASE ORAL
Qty: 180 TABLET | Refills: 0 | Status: SHIPPED | OUTPATIENT
Start: 2022-01-13 | End: 2022-04-18

## 2022-01-13 RX ORDER — LISINOPRIL AND HYDROCHLOROTHIAZIDE 20; 12.5 MG/1; MG/1
1 TABLET ORAL DAILY
Qty: 90 TABLET | Refills: 0 | Status: SHIPPED | OUTPATIENT
Start: 2022-01-13 | End: 2022-03-14

## 2022-01-13 RX ORDER — DUTASTERIDE 0.5 MG/1
0.5 CAPSULE, LIQUID FILLED ORAL DAILY
Qty: 90 CAPSULE | Refills: 0 | Status: SHIPPED | OUTPATIENT
Start: 2022-01-13 | End: 2022-04-18

## 2022-01-13 RX ORDER — ATORVASTATIN CALCIUM 40 MG/1
40 TABLET, FILM COATED ORAL DAILY
Qty: 90 TABLET | Refills: 0 | Status: SHIPPED | OUTPATIENT
Start: 2022-01-13 | End: 2022-04-18

## 2022-01-13 RX ORDER — ALLOPURINOL 300 MG/1
300 TABLET ORAL DAILY
Qty: 90 TABLET | Refills: 0 | Status: SHIPPED | OUTPATIENT
Start: 2022-01-13 | End: 2022-04-18

## 2022-01-13 NOTE — TELEPHONE ENCOUNTER
Caller: Ramiro Tovar JR    Relationship: Self    Best call back number: [phone]      Requested Prescriptions:   Requested Prescriptions     Pending Prescriptions Disp Refills   • allopurinol (ZYLOPRIM) 300 MG tablet 90 tablet 3     Sig: Take 1 tablet by mouth Daily.   • atorvastatin (LIPITOR) 40 MG tablet 90 tablet 3     Sig: Take 1 tablet by mouth Daily.   • lisinopril-hydrochlorothiazide (PRINZIDE,ZESTORETIC) 20-12.5 MG per tablet 90 tablet 3     Sig: Take 1 tablet by mouth Daily.   • metFORMIN ER (GLUCOPHAGE-XR) 500 MG 24 hr tablet 180 tablet 1     Sig: Take 2 tablets by mouth Daily With Breakfast.   • dutasteride (AVODART) 0.5 MG capsule 90 capsule 3     Sig: Take 1 capsule by mouth Daily.        Pharmacy where request should be sent: [facility] [address] [place] [address] [address] [phone] [facility] [phone] FX         Does the patient have less than a 3 day supply:  [] Yes  [x] No    Daija Cohen, PCT   01/13/22 14:38 EST

## 2022-03-03 ENCOUNTER — TELEPHONE (OUTPATIENT)
Dept: GASTROENTEROLOGY | Facility: CLINIC | Age: 62
End: 2022-03-03

## 2022-03-03 NOTE — TELEPHONE ENCOUNTER
Called patient twice yesterday and twice this morning and every time it goes to voicemail. Please reschedule patient if they call.

## 2022-03-14 ENCOUNTER — TELEPHONE (OUTPATIENT)
Dept: FAMILY MEDICINE CLINIC | Facility: CLINIC | Age: 62
End: 2022-03-14

## 2022-03-14 DIAGNOSIS — I10 BENIGN ESSENTIAL HYPERTENSION: Primary | ICD-10-CM

## 2022-03-14 DIAGNOSIS — I10 BENIGN ESSENTIAL HYPERTENSION: ICD-10-CM

## 2022-03-14 RX ORDER — LISINOPRIL AND HYDROCHLOROTHIAZIDE 25; 20 MG/1; MG/1
1 TABLET ORAL DAILY
Qty: 90 TABLET | Refills: 3 | Status: SHIPPED | OUTPATIENT
Start: 2022-03-14 | End: 2022-07-13 | Stop reason: SDUPTHER

## 2022-03-14 RX ORDER — LISINOPRIL AND HYDROCHLOROTHIAZIDE 25; 20 MG/1; MG/1
1 TABLET ORAL DAILY
Qty: 90 TABLET | Refills: 3 | Status: SHIPPED | OUTPATIENT
Start: 2022-03-14 | End: 2022-03-14 | Stop reason: SDUPTHER

## 2022-03-14 NOTE — TELEPHONE ENCOUNTER
PATIENT CALLED STATING THAT HIS BP HAS BEEN RUNNING HIGH SINCE STARTED KETO DIET.  BP HAS BEEN 160/91, 174/106.  WANTED TO KNOW IF DR WOULD LIKE TO INCREASE DOSAGE OF BP MEDS OR SHOULD STOP WITH KETO DIET.    PLEASE ADVISE    [phone]

## 2022-03-14 NOTE — TELEPHONE ENCOUNTER
I sent lisinopril hctz 20/25mg (the hctz portion is higher) to start taking.   I would just make sure avoiding high sodium foods and salt.  RRJ

## 2022-03-29 RX ORDER — USTEKINUMAB 90 MG/ML
INJECTION, SOLUTION SUBCUTANEOUS
Qty: 1 EACH | Refills: 0 | Status: SHIPPED | OUTPATIENT
Start: 2022-03-29 | End: 2022-07-25

## 2022-04-17 DIAGNOSIS — E11.9 TYPE 2 DIABETES MELLITUS WITHOUT COMPLICATION, WITHOUT LONG-TERM CURRENT USE OF INSULIN: ICD-10-CM

## 2022-04-18 RX ORDER — METFORMIN HYDROCHLORIDE 500 MG/1
TABLET, EXTENDED RELEASE ORAL
Qty: 180 TABLET | Refills: 0 | Status: SHIPPED | OUTPATIENT
Start: 2022-04-18 | End: 2022-07-13 | Stop reason: SDUPTHER

## 2022-04-18 RX ORDER — LISINOPRIL AND HYDROCHLOROTHIAZIDE 20; 12.5 MG/1; MG/1
TABLET ORAL
Qty: 90 TABLET | Refills: 0 | Status: SHIPPED | OUTPATIENT
Start: 2022-04-18 | End: 2022-09-19 | Stop reason: SDUPTHER

## 2022-04-18 RX ORDER — DUTASTERIDE 0.5 MG/1
CAPSULE, LIQUID FILLED ORAL
Qty: 90 CAPSULE | Refills: 0 | Status: SHIPPED | OUTPATIENT
Start: 2022-04-18 | End: 2022-07-13 | Stop reason: SDUPTHER

## 2022-04-18 RX ORDER — ALLOPURINOL 300 MG/1
TABLET ORAL
Qty: 90 TABLET | Refills: 0 | Status: SHIPPED | OUTPATIENT
Start: 2022-04-18 | End: 2022-07-13 | Stop reason: SDUPTHER

## 2022-04-18 RX ORDER — ATORVASTATIN CALCIUM 40 MG/1
TABLET, FILM COATED ORAL
Qty: 90 TABLET | Refills: 0 | Status: SHIPPED | OUTPATIENT
Start: 2022-04-18 | End: 2022-07-13 | Stop reason: SDUPTHER

## 2022-05-12 ENCOUNTER — OFFICE VISIT (OUTPATIENT)
Dept: GASTROENTEROLOGY | Facility: CLINIC | Age: 62
End: 2022-05-12

## 2022-05-12 VITALS
HEIGHT: 73 IN | OXYGEN SATURATION: 97 % | SYSTOLIC BLOOD PRESSURE: 191 MMHG | BODY MASS INDEX: 33.58 KG/M2 | DIASTOLIC BLOOD PRESSURE: 88 MMHG | TEMPERATURE: 97.5 F | HEART RATE: 55 BPM | WEIGHT: 253.4 LBS

## 2022-05-12 DIAGNOSIS — K50.012 CROHN'S DISEASE OF SMALL INTESTINE WITH INTESTINAL OBSTRUCTION: Primary | ICD-10-CM

## 2022-05-12 PROCEDURE — 99214 OFFICE O/P EST MOD 30 MIN: CPT | Performed by: PHYSICIAN ASSISTANT

## 2022-05-12 NOTE — PROGRESS NOTES
Chief Complaint  Crohn's Disease and Med Refill    Subjective        History of Present Illness  Ramiro Tovar JR is a  62 y.o. male patient of Dr. Calle, new to me today, here for follow-up.  He has a history of Crohn's disease of the small intestine.  He is a previous patient of Dr. Sandro Sotomayor.  He underwent EGD and colonoscopy in December 2019 with findings of LA class a esophagitis and gastritis as well as 2 adenomatous polyps from the colon.  Random biopsies of the colon showed no active inflammation related to Crohn's disease and the visualized terminal ileum was normal.  He was switched from Pentasa to Stelara in February 2020.    He underwent a gastric emptying study which was abnormal in December 2020.  Subsequently underwent an upper GI small bowel follow-through which showed some esophageal dysmotility but no evidence of obstruction.    Couple of episodes of nausea and vomiting prn typically with trigger foods such as cabbage.  Side from that, states his Crohn's disease is in full control with the Stelara.  Denies any abdominal pain, diarrhea, blood per rectum, extraintestinal manifestations.    Following with dermatology; last visit 6-7 months ago.     Up-to-date on all vaccinations.    He is scheduled to get his labs with his PCP on Monday.  He would like to wait until then to have his annual labs completed.    Past Medical History:   Diagnosis Date   • Aspirin long-term use    • Asymptomatic hyperuricemia    • BPH (benign prostatic hyperplasia)    • Chronic pain syndrome    • Crohn's colitis (HCC)    • Diabetes (HCC)    • Drug therapy    • Dyslipidemia    • Gout    • Hand weakness    • Herpes zoster    • Hypertension    • Knee pain    • Low back pain    • Obesity due to excess calories    • Peripheral neuropathy    • Personal history of gout    • Type 2 diabetes mellitus (HCC)    • Vitamin D deficiency        Past Surgical History:   Procedure Laterality Date   • CATARACT EXTRACTION     •  CHOLECYSTECTOMY     • COLONOSCOPY  2014   • COLONOSCOPY N/A 12/10/2019    diverticulosis, ih, polyps: TAs   • ENDOSCOPY N/A 12/10/2019    LA Grade A reflux esophagitis, gastritis, Path benign   • INTESTINAL BYPASS      intestinal stricturoplasty   • UPPER GASTROINTESTINAL ENDOSCOPY  2014       Family History   Problem Relation Age of Onset   • Heart disease Mother    • Heart attack Mother    • Diabetes Mother    • Gout Father        Social History     Socioeconomic History   • Marital status:    Tobacco Use   • Smoking status: Never Smoker   • Smokeless tobacco: Never Used   Substance and Sexual Activity   • Alcohol use: Yes     Comment: RARE   • Drug use: No   • Sexual activity: Defer       Allergies   Allergen Reactions   • Sulfa Antibiotics Rash   • Hydromorphone      Affects his chron's disease doesn't want this drug   • Hydromorphone Hcl      Affects his chron's disease doesn't want this drug. Makes him sick and does not take away pain   • Morphine And Related Nausea And Vomiting     Has problems with his chron's  Has problems with his chron's. Makes him sick and does not take away pain; makes crohn's worse   • Penicillins Rash     UNKNOWN-CHILDHOOD       Current Outpatient Medications on File Prior to Visit   Medication Sig Dispense Refill   • allopurinol (ZYLOPRIM) 300 MG tablet Take 1 tablet by mouth once daily 90 tablet 0   • aspirin 81 MG tablet Take  by mouth daily.     • atorvastatin (LIPITOR) 40 MG tablet Take 1 tablet by mouth once daily 90 tablet 0   • colchicine 0.6 MG tablet Take 1 tablet by mouth Daily. (Patient taking differently: Take 0.6 mg by mouth As Needed.) 30 tablet 6   • dutasteride (AVODART) 0.5 MG capsule Take 1 capsule by mouth once daily 90 capsule 0   • gabapentin (NEURONTIN) 300 MG capsule Take 300 mg by mouth Daily.     • HYDROcodone-acetaminophen (NORCO)  MG per tablet Take 1 tablet by mouth 3 (Three) Times a Day As Needed. for pain  0   •  "lisinopril-hydrochlorothiazide (PRINZIDE,ZESTORETIC) 20-12.5 MG per tablet Take 1 tablet by mouth once daily 90 tablet 0   • lisinopril-hydrochlorothiazide (PRINZIDE,ZESTORETIC) 20-25 MG per tablet Take 1 tablet by mouth Daily. 90 tablet 3   • metFORMIN ER (GLUCOPHAGE-XR) 500 MG 24 hr tablet TAKE 2 TABLETS BY MOUTH ONCE DAILY WITH BREAKFAST 180 tablet 0   • Multiple Vitamin (MULTI VITAMIN DAILY PO) Take  by mouth daily.     • NON FORMULARY Tumeric 1000mg     • Omega-3 Fatty Acids (FISH OIL) 1000 MG capsule capsule Take 1 capsule by mouth.     • ondansetron (ZOFRAN) 4 MG tablet TAKE 1 TABLET BY MOUTH EVERY 6 HOURS AS NEEDED FOR NAUSEA 36 tablet 0   • pantoprazole (PROTONIX) 40 MG EC tablet Take 1 tablet by mouth Daily. 90 tablet 3   • Stelara 90 MG/ML solution prefilled syringe Injection INJECT 1 SYRINGE UNDER THE SKIN (SUBCUTANEOUS INJECTION) EVERY 8 WEEKS AS DIRECTED 1 each 0   • ustekinumab (STELARA) 130 MG/26ML solution injection Administer STELARA 130mg IV     • vitamin B-12 (CYANOCOBALAMIN) 1000 MCG tablet Take  by mouth daily.     • sildenafil (Viagra) 100 MG tablet Take 1 tablet by mouth Daily As Needed for Erectile Dysfunction. 30 tablet 5   • [DISCONTINUED] terbinafine (LamISIL) 250 MG tablet Take 1 tablet by mouth Daily. 90 tablet 0     No current facility-administered medications on file prior to visit.       Review of Systems   Constitutional: Negative for chills, fever, unexpected weight gain and unexpected weight loss.   Respiratory: Negative for cough and shortness of breath.    Cardiovascular: Negative for chest pain and palpitations.   Gastrointestinal: Negative for abdominal pain, anal bleeding, blood in stool, constipation, diarrhea and GERD.   Skin: Negative for color change, rash and skin lesions.        Objective   Vital Signs:   BP (!) 191/88   Pulse 55   Temp 97.5 °F (36.4 °C)   Ht 185.4 cm (73\")   Wt 115 kg (253 lb 6.4 oz)   SpO2 97%   BMI 33.43 kg/m²       Physical Exam  Vitals and " nursing note reviewed.   Constitutional:       General: He is not in acute distress.     Appearance: Normal appearance. He is not ill-appearing.   HENT:      Head: Normocephalic and atraumatic.      Right Ear: External ear normal.      Left Ear: External ear normal.   Eyes:      General: No scleral icterus.     Conjunctiva/sclera: Conjunctivae normal.      Pupils: Pupils are equal, round, and reactive to light.   Cardiovascular:      Rate and Rhythm: Normal rate and regular rhythm.      Heart sounds: Normal heart sounds.   Pulmonary:      Effort: Pulmonary effort is normal.      Breath sounds: Normal breath sounds.   Abdominal:      General: Abdomen is flat. Bowel sounds are normal. There is no distension.      Palpations: Abdomen is soft.      Tenderness: There is no abdominal tenderness. There is no guarding or rebound.   Musculoskeletal:      Cervical back: Normal range of motion and neck supple.   Skin:     General: Skin is warm and dry.   Neurological:      Mental Status: He is alert and oriented to person, place, and time.   Psychiatric:         Mood and Affect: Mood normal.         Behavior: Behavior normal.          Result Review :                     Assessment and Plan    Diagnoses and all orders for this visit:    1. Crohn's disease of small intestine with intestinal obstruction (HCC) (Primary)  -     QuantiFERON TB Gold; Future  -     C-reactive Protein; Future  -     Comprehensive Metabolic Panel  -     CBC & Differential  -     Vitamin D 25 Hydroxy      · Annual labs have been ordered.  He will complete these on Monday at his primary care's office.  · Will continue on Stelara.  · Colonoscopy recall 5 years which would be December 2024.  · We did discuss that immunosuppressive therapy puts the patient at higher risk for infection; patient is aware and will take appropriate precautions. I did  on staying updated on vaccines, including annual flu shot. The patient is also aware of the rare but  increased risk of malignancy, such as lymphoma and skin cancer, with biologic or immunosuppressive therapy as well. I also counseled the patient to follow with a dermatologist annually and sunscreen use.  · Follow-up in 1 year.  He knows to contact her office sooner if he should begin to experience symptoms.        Follow Up   Return in about 1 year (around 5/12/2023).    Dragon dictation used throughout this note.     GERTRUDIS Jones

## 2022-05-13 ENCOUNTER — TELEPHONE (OUTPATIENT)
Dept: GASTROENTEROLOGY | Facility: CLINIC | Age: 62
End: 2022-05-13

## 2022-05-13 DIAGNOSIS — K50.012 CROHN'S DISEASE OF SMALL INTESTINE WITH INTESTINAL OBSTRUCTION: Primary | ICD-10-CM

## 2022-05-16 ENCOUNTER — OFFICE VISIT (OUTPATIENT)
Dept: FAMILY MEDICINE CLINIC | Facility: CLINIC | Age: 62
End: 2022-05-16

## 2022-05-16 VITALS
HEART RATE: 55 BPM | OXYGEN SATURATION: 98 % | SYSTOLIC BLOOD PRESSURE: 128 MMHG | DIASTOLIC BLOOD PRESSURE: 80 MMHG | TEMPERATURE: 98.3 F | BODY MASS INDEX: 33.93 KG/M2 | HEIGHT: 73 IN | WEIGHT: 256 LBS | RESPIRATION RATE: 20 BRPM

## 2022-05-16 DIAGNOSIS — K50.00 CROHN'S DISEASE OF SMALL INTESTINE WITHOUT COMPLICATION: ICD-10-CM

## 2022-05-16 DIAGNOSIS — E78.5 DYSLIPIDEMIA: ICD-10-CM

## 2022-05-16 DIAGNOSIS — E55.9 VITAMIN D DEFICIENCY: ICD-10-CM

## 2022-05-16 DIAGNOSIS — E11.9 TYPE 2 DIABETES MELLITUS WITHOUT COMPLICATION, WITHOUT LONG-TERM CURRENT USE OF INSULIN: ICD-10-CM

## 2022-05-16 DIAGNOSIS — I10 BENIGN ESSENTIAL HYPERTENSION: ICD-10-CM

## 2022-05-16 DIAGNOSIS — Z00.00 WELLNESS EXAMINATION: Primary | ICD-10-CM

## 2022-05-16 DIAGNOSIS — E66.09 CLASS 1 OBESITY DUE TO EXCESS CALORIES WITH SERIOUS COMORBIDITY AND BODY MASS INDEX (BMI) OF 33.0 TO 33.9 IN ADULT: ICD-10-CM

## 2022-05-16 DIAGNOSIS — E79.0 HYPERURICEMIA: ICD-10-CM

## 2022-05-16 PROCEDURE — 99396 PREV VISIT EST AGE 40-64: CPT | Performed by: FAMILY MEDICINE

## 2022-05-16 NOTE — PATIENT INSTRUCTIONS
"\"7-1-5-Almost None!\"  Healthy Habits Start Early    EAT 5 OR MORE SERVINGS OF VEGETABLES AND FRUITS EVERY DAY.    Help Ramiro get three vegetables and two fruits each day. Red, green, yellow, orange...encourage them to try all the colors so they can enjoy different flavors and get more vitamins.    How can I help Ramiro do this?  ---------------------------------------------  -BE PATIENT WITH Ramiro, remember it may take 10 times before they start to like new food. So, start with small bites and just keep trying.  -Serve at least one vegetable or fruit at every meal. Even try two. Remember, portions do not have to be as big as you think.  -Encourage eating fruits and vegetables instead of drinking them..it's a better way to get fiber and vitamins..so limit the amount of juice to 1/2 cup per day for children 1-6 years and one cup per day for children 7-18 years of age. Try using 1/2 part water and 1/2 part juice.    Spend less than two hours per day watching television and other screen media. Screen media includes video games, movies and computer use for entertainment.    How can I help Ramiro do this?  -Turn off the TV at dinner. Dinner is the best time to hang out with your kids and just talk, learn about their day, and tell them about your day. Your kids have a lot to learn from you and dinner is a great time to share.  "

## 2022-05-16 NOTE — PROGRESS NOTES
Subjective   Ramiro Tovar JR is a 62 y.o. male. Presents today for   Chief Complaint   Patient presents with   • Annual Exam   • Med Refill   • Hypertension   • Hyperlipidemia   • Crohn's Disease       History of Present Illness  patien there for [facility] exam;;  Has crohns doing well on stellara and less issues;  Has lab orders for GI;  Has also dm2, htn, hyperuricemia, vitamin d def and hld.    Review of Systems   Respiratory: Negative for shortness of breath.    Cardiovascular: Negative for chest pain and palpitations.   Gastrointestinal: Negative for abdominal pain, nausea and vomiting.       Patient Active Problem List   Diagnosis   • Hyperuricemia   • Benign essential hypertension   • Benign prostatic hypertrophy without urinary obstruction   • Chronic pain syndrome   • Crohn's disease of colon (HCC)   • Dyslipidemia   • Gout   • Weakness of hand   • Knee pain   • Low back pain   • Peripheral neuropathy   • Type 2 diabetes mellitus without complication, without long-term current use of insulin (HCC)   • Vitamin D deficiency   • Crohn's disease (HCC)   • History of colectomy   • Hyperlipidemia   • Calculus of kidney   • Adult body mass index greater than 30   • Small bowel obstruction (HCC)   • Gastroesophageal reflux disease   • Crohn's disease of small intestine with intestinal obstruction (HCC)       Social History     Socioeconomic History   • Marital status:    Tobacco Use   • Smoking status: Never Smoker   • Smokeless tobacco: Never Used   Vaping Use   • Vaping Use: Never used   Substance and Sexual Activity   • Alcohol use: Yes     Comment: RARE   • Drug use: No   • Sexual activity: Defer       Allergies   Allergen Reactions   • Sulfa Antibiotics Rash   • Hydromorphone      Affects his chron's disease doesn't want this drug   • Hydromorphone Hcl      Affects his chron's disease doesn't want this drug. Makes him sick and does not take away pain   • Morphine And Related Nausea And Vomiting     Has  problems with his chron's  Has problems with his chron's. Makes him sick and does not take away pain; makes crohn's worse   • Penicillins Rash     UNKNOWN-CHILDHOOD       Current Outpatient Medications on File Prior to Visit   Medication Sig Dispense Refill   • allopurinol (ZYLOPRIM) 300 MG tablet Take 1 tablet by mouth once daily 90 tablet 0   • aspirin 81 MG tablet Take  by mouth daily.     • atorvastatin (LIPITOR) 40 MG tablet Take 1 tablet by mouth once daily 90 tablet 0   • colchicine 0.6 MG tablet Take 1 tablet by mouth Daily. (Patient taking differently: Take 0.6 mg by mouth As Needed.) 30 tablet 6   • dutasteride (AVODART) 0.5 MG capsule Take 1 capsule by mouth once daily 90 capsule 0   • gabapentin (NEURONTIN) 300 MG capsule Take 300 mg by mouth Daily.     • HYDROcodone-acetaminophen (NORCO)  MG per tablet Take 1 tablet by mouth 3 (Three) Times a Day As Needed. for pain  0   • lisinopril-hydrochlorothiazide (PRINZIDE,ZESTORETIC) 20-25 MG per tablet Take 1 tablet by mouth Daily. 90 tablet 3   • metFORMIN ER (GLUCOPHAGE-XR) 500 MG 24 hr tablet TAKE 2 TABLETS BY MOUTH ONCE DAILY WITH BREAKFAST 180 tablet 0   • Multiple Vitamin (MULTI VITAMIN DAILY PO) Take  by mouth daily.     • NON FORMULARY Tumeric 1000mg     • Omega-3 Fatty Acids (FISH OIL) 1000 MG capsule capsule Take 1 capsule by mouth.     • ondansetron (ZOFRAN) 4 MG tablet TAKE 1 TABLET BY MOUTH EVERY 6 HOURS AS NEEDED FOR NAUSEA 36 tablet 0   • pantoprazole (PROTONIX) 40 MG EC tablet Take 1 tablet by mouth Daily. 90 tablet 3   • sildenafil (Viagra) 100 MG tablet Take 1 tablet by mouth Daily As Needed for Erectile Dysfunction. 30 tablet 5   • Stelara 90 MG/ML solution prefilled syringe Injection INJECT 1 SYRINGE UNDER THE SKIN (SUBCUTANEOUS INJECTION) EVERY 8 WEEKS AS DIRECTED 1 each 0   • ustekinumab (STELARA) 130 MG/26ML solution injection Administer STELARA 130mg IV     • vitamin B-12 (CYANOCOBALAMIN) 1000 MCG tablet Take  by mouth daily.     •  "lisinopril-hydrochlorothiazide (PRINZIDE,ZESTORETIC) 20-12.5 MG per tablet Take 1 tablet by mouth once daily 90 tablet 0     No current facility-administered medications on file prior to visit.       Objective   Vitals:    05/16/22 0920   BP: 128/80   BP Location: Left arm   Patient Position: Sitting   Cuff Size: Adult   Pulse: 55   Resp: 20   Temp: 98.3 °F (36.8 °C)   TempSrc: Temporal   SpO2: 98%   Weight: 116 kg (256 lb)   Height: 185.4 cm (73\")   PainSc:   4     Body mass index is 33.78 kg/m².    Physical Exam  Vitals and nursing note reviewed.   Constitutional:       Appearance: He is well-developed.   HENT:      Head: Normocephalic and atraumatic.   Neck:      Thyroid: No thyromegaly.      Vascular: No JVD.   Cardiovascular:      Rate and Rhythm: Normal rate and regular rhythm.      Heart sounds: Normal heart sounds. No murmur heard.    No friction rub. No gallop.   Pulmonary:      Effort: Pulmonary effort is normal. No respiratory distress.      Breath sounds: Normal breath sounds. No wheezing or rales.   Abdominal:      General: Bowel sounds are normal. There is no distension.      Palpations: Abdomen is soft.      Tenderness: There is no abdominal tenderness. There is no guarding or rebound.   Musculoskeletal:      Cervical back: Neck supple.   Skin:     General: Skin is warm and dry.   Neurological:      Mental Status: He is alert.   Psychiatric:         Behavior: Behavior normal.         Assessment & Plan   Diagnoses and all orders for this visit:    1. Wellness examination (Primary)  Obesity 33.9 with comorbidity    counseled on diet and exercise  See GI  Up dated diabetes.  Ok stop asa         -Follow up: 4 months and prn   "

## 2022-05-31 ENCOUNTER — TELEPHONE (OUTPATIENT)
Dept: FAMILY MEDICINE CLINIC | Facility: CLINIC | Age: 62
End: 2022-05-31

## 2022-05-31 NOTE — TELEPHONE ENCOUNTER
Caller: Ramiro Tovar JR    Relationship: Self    Best call back number: [phone]    What is the best time to reach you: ANY    Who are you requesting to speak with (clinical staff, provider,  specific staff member): CLINICAL    What was the call regarding: PATIENT STATES THAT HE HAD TO GET HIS BLOOD WORK DONE AT THE [facility] IN [place], [place] DUE TO THE OFFICE NOT BEING ABLE TO DRAW HIS BLOOD. HE STATES THAT THE OFFICE NEEDS TO FAX OVER HIS LAB ORDERS. THE FAX NUMBER -[phone]    Do you require a callback: NO

## 2022-05-31 NOTE — TELEPHONE ENCOUNTER
Caller: Ramiro Tovar JR    Relationship: Self    Best call back number:[phone]     What is the best time to reach you: ANY    Who are you requesting to speak with (clinical staff, provider,  specific staff member): CLINICAL        What was the call regarding: PATIENT WANTS TO KNOW IF HE NEEDS TO FAX HIS ORDERS TO THE [facility] BEFORE HIS FOLLOW UP APPOINTMENT IN September. HE STATES THAT THE LAB IN THE OFFICE IS UNSUCCESSFUL IN GETTING HIS BLOOD AND HE WOULD RATHER GO TO THIS OFFICE INSTEAD.     Do you require a callback: YES    PLEASE ADVISE PATIENT

## 2022-06-06 NOTE — TELEPHONE ENCOUNTER
Called [facility] to make sure we did not miss any labs being scanned over. Looking at was drawn on 6/2/22 tb or hep b was not completed. Waiting to see if something was missed

## 2022-06-16 NOTE — TELEPHONE ENCOUNTER
email from hemanth at [facility]:   Hello, it’s looking like we won’t be able to do the prefilled syringes due to insurance. Is that ok? Would you like us to call the patient and make sure they are ok with that?    - she faxed over the order form and it was signed and faxed back signed by adal

## 2022-07-13 ENCOUNTER — TELEPHONE (OUTPATIENT)
Dept: FAMILY MEDICINE CLINIC | Facility: CLINIC | Age: 62
End: 2022-07-13

## 2022-07-13 DIAGNOSIS — I10 BENIGN ESSENTIAL HYPERTENSION: ICD-10-CM

## 2022-07-13 DIAGNOSIS — E11.9 TYPE 2 DIABETES MELLITUS WITHOUT COMPLICATION, WITHOUT LONG-TERM CURRENT USE OF INSULIN: ICD-10-CM

## 2022-07-13 RX ORDER — ALLOPURINOL 300 MG/1
300 TABLET ORAL DAILY
Qty: 90 TABLET | Refills: 0 | Status: SHIPPED | OUTPATIENT
Start: 2022-07-13 | End: 2022-10-17

## 2022-07-13 RX ORDER — METFORMIN HYDROCHLORIDE 500 MG/1
1000 TABLET, EXTENDED RELEASE ORAL
Qty: 180 TABLET | Refills: 0 | Status: SHIPPED | OUTPATIENT
Start: 2022-07-13 | End: 2022-09-19

## 2022-07-13 RX ORDER — ATORVASTATIN CALCIUM 40 MG/1
40 TABLET, FILM COATED ORAL DAILY
Qty: 90 TABLET | Refills: 0 | Status: SHIPPED | OUTPATIENT
Start: 2022-07-13 | End: 2022-09-19 | Stop reason: SDUPTHER

## 2022-07-13 RX ORDER — LISINOPRIL AND HYDROCHLOROTHIAZIDE 25; 20 MG/1; MG/1
1 TABLET ORAL DAILY
Qty: 90 TABLET | Refills: 0 | Status: SHIPPED | OUTPATIENT
Start: 2022-07-13 | End: 2022-10-17

## 2022-07-13 RX ORDER — DUTASTERIDE 0.5 MG/1
0.5 CAPSULE, LIQUID FILLED ORAL DAILY
Qty: 90 CAPSULE | Refills: 0 | Status: SHIPPED | OUTPATIENT
Start: 2022-07-13 | End: 2022-10-17

## 2022-07-13 NOTE — TELEPHONE ENCOUNTER
Caller: Rosette Tovar    Relationship: Emergency Contact    Best call back number:    Requested Prescriptions:   Requested Prescriptions     Pending Prescriptions Disp Refills   • lisinopril-hydrochlorothiazide (PRINZIDE,ZESTORETIC) 20-25 MG per tablet 90 tablet 3     Sig: Take 1 tablet by mouth Daily.   • allopurinol (ZYLOPRIM) 300 MG tablet 90 tablet 0     Sig: Take 1 tablet by mouth Daily.   • atorvastatin (LIPITOR) 40 MG tablet 90 tablet 0     Sig: Take 1 tablet by mouth Daily.   • metFORMIN ER (GLUCOPHAGE-XR) 500 MG 24 hr tablet 180 tablet 0     Sig: Take 2 tablets by mouth Daily With Breakfast.   • dutasteride (AVODART) 0.5 MG capsule 90 capsule 0     Sig: Take 1 capsule by mouth Daily.        Pharmacy where request should be sent:  [facility]  [address] [place] [address] [phone]    Additional details provided by patient: PATIENT IS REQUESTING A 90 DAY SUPPLY ON ALL MEDICATIONS REQUESTED.  SAYS THAT THERE ARE NO REFILLS SO NEW PRESCRIPTIONS WILL NEED TO BE WRITTEN BEFORE THEY CAN BE REFILLED.     Does the patient have less than a 3 day supply:  [x] Yes  [] No    Saritha Saleh Rep   07/13/22 12:14 EDT

## 2022-07-14 ENCOUNTER — TELEPHONE (OUTPATIENT)
Dept: GASTROENTEROLOGY | Facility: CLINIC | Age: 62
End: 2022-07-14

## 2022-07-14 NOTE — TELEPHONE ENCOUNTER
----- Message from Ely Gonsalez MA sent at 7/14/2022  9:51 AM EDT -----  Regarding: Stelara [facility]  Pt needs to complete Hep b and TB labs. Soleo is getting push back due to outdated TB test. I looks like an order was put in back in may.

## 2022-07-25 RX ORDER — USTEKINUMAB 90 MG/ML
INJECTION, SOLUTION SUBCUTANEOUS
Qty: 1 EACH | Refills: 2 | Status: SHIPPED | OUTPATIENT
Start: 2022-07-25 | End: 2023-03-28

## 2022-08-02 ENCOUNTER — TELEPHONE (OUTPATIENT)
Dept: GASTROENTEROLOGY | Facility: CLINIC | Age: 62
End: 2022-08-02

## 2022-08-02 NOTE — TELEPHONE ENCOUNTER
"----- Message from Ramiro Tovar JR sent at 8/2/2022 12:22 PM EDT -----  Regarding: Jc Casper is Ramiro Tovar I saw you to have my medicine renewed but you sent the prescription to the wrong company that is not in my insurance plan. It should go to [employer] so insurance covers it. Also I have been taking it for severl years and they are calling me trying to set up an initial dose IV (which I have already had) with a copay of $2000 don\"t know how this happened but it needs to be corrected my next dose is coming up soon. The number to the speciality pharmacy with [employer] [phone] that is the only one my insurance will pay and the one i have been used since i started.  Thank You,  Ramiro Tovar  [phone]  "

## 2022-08-04 ENCOUNTER — TELEPHONE (OUTPATIENT)
Dept: FAMILY MEDICINE CLINIC | Facility: CLINIC | Age: 62
End: 2022-08-04

## 2022-08-04 DIAGNOSIS — Z79.899 DRUG THERAPY: ICD-10-CM

## 2022-08-04 DIAGNOSIS — E78.5 DYSLIPIDEMIA: ICD-10-CM

## 2022-08-04 DIAGNOSIS — E55.9 VITAMIN D DEFICIENCY: ICD-10-CM

## 2022-08-04 DIAGNOSIS — E79.0 HYPERURICEMIA: ICD-10-CM

## 2022-08-04 DIAGNOSIS — E11.9 TYPE 2 DIABETES MELLITUS WITHOUT COMPLICATION, WITHOUT LONG-TERM CURRENT USE OF INSULIN: Primary | ICD-10-CM

## 2022-08-04 DIAGNOSIS — R53.83 OTHER FATIGUE: ICD-10-CM

## 2022-08-04 DIAGNOSIS — K50.00 CROHN'S DISEASE OF SMALL INTESTINE WITHOUT COMPLICATION: ICD-10-CM

## 2022-08-04 NOTE — TELEPHONE ENCOUNTER
----- Message from Vandana Chaidez MA sent at 8/2/2022  1:41 PM EDT -----  Regarding: FW: Lab Work    ----- Message -----  From: Ramiro Tovar JR  Sent: 8/2/2022  12:28 PM EDT  To: Julia Becerra [facility]  Subject: Lab Work                                         I would like to request my lab orders on my upcoming appointment on Sept. 19th 2022 I would like to receive my orders to have my labs taken at [facility] at the [facility] located in [facility] due to your offices lab being unsuccessful in drawing my blood for the last three visits or you can send the orders straight to me at [address] [facility] [address]  Thank you,  Ramiro Tovar  [phone]

## 2022-08-08 ENCOUNTER — TELEPHONE (OUTPATIENT)
Dept: GASTROENTEROLOGY | Facility: CLINIC | Age: 62
End: 2022-08-08

## 2022-08-08 NOTE — TELEPHONE ENCOUNTER
----- Message from Ramiro Tovar JR sent at 8/5/2022  1:13 PM EDT -----  Regarding: Bharati Calle,  I am Ramiro Tovar [birth year] I saw your nurse practitioner for my last visit to get my Stelara refills. She sent the refills to some company that my insurance doesn’t even cover. I have [employer] and they have their own specific specialized pharmacy I have messaged her with no response and I have tried to call your office everyday for last three days pressing number 3 holding for about 10 minutes then phone rings but nobody answers after about 5 minutes of ringing someone hangs up on me but I need to get my medicine straightened out I’m due for a maintenance dose in 10 days also they were trying to schedule me for an IV dose which I already had is that right? If you could have someone straightened this out I would appreciate it.  Thank you,  Ramiro Tovar  [phone]

## 2022-08-08 NOTE — TELEPHONE ENCOUNTER
Call to pt.  [facility] has received message from [facility] that Stelara injection (maintenance dosing) is being shipped and pt will be able to take on schedule.     [facility] has received message from some other provider re: initial Stelara infusion - this is inappropriate because already on maintenance dosing.      See media tab re: soleo.      Pt reports that will be going to [place] for several months and wondering about refills.  See 7/25/22 refills.  Advise refills available.  [facility] will request refills when due.  Pt verb understanding - \[facility] [place] [facility]\"" will update [facility] with current address at that time.     Update to Dr Calle and Ely.

## 2022-08-16 ENCOUNTER — TELEPHONE (OUTPATIENT)
Dept: GASTROENTEROLOGY | Facility: CLINIC | Age: 62
End: 2022-08-16

## 2022-08-16 NOTE — TELEPHONE ENCOUNTER
I have gotten this patient stelara approved until feb 2024. Approval ltter is being faxed. Pt was notified that the stelara was approved. Can you please send a new rx to [facility]. Thanks!!

## 2022-08-16 NOTE — TELEPHONE ENCOUNTER
See 7/27/22 stelara rx to [facility], 1 each, R2 with receipt confirmed.     Message to Ely.

## 2022-09-07 LAB
25(OH)D3 SERPL-MCNC: 56.3 NG/ML (ref 30–100)
ALBUMIN UR-MCNC: 9.7 MG/L
ALBUMIN/CREATININE RATIO, URINE: 14.6
ALT SERPL W P-5'-P-CCNC: 29 U/L (ref 1–41)
BASOPHILS # BLD MANUAL: 0.3 10*3/MM3 (ref 0–0.2)
BASOPHILS NFR BLD MANUAL: 0.6 % (ref 0–1.5)
CHOLEST SERPL-MCNC: 98 MG/DL
CONV AUTO IG%: 0
CONV AUTO IG%: 0
CREAT UR-MCNC: 66.4 MG/DL
CREATINE SERPL-MCNC: 0.92 MG/DL
EOSINOPHIL # BLD MANUAL: 0.19 10*3/MM3 (ref 0–0.4)
EOSINOPHIL NFR BLD MANUAL: 3.8 % (ref 0.3–6.2)
ERYTHROCYTE [DISTWIDTH] IN BLOOD BY AUTOMATED COUNT: 13.2 %
EXTERNAL HEMATOCRIT: 45 %
EXTERNAL PLATELET COUNT: 146 K/ΜL
HBA1C MFR BLD: 5.2 % (ref 4.8–5.9)
HDLC SERPL-MCNC: 34 MG/DL (ref 40–60)
HGB BLD-MCNC: 15 G/DL (ref 13–17.7)
INSULIN SERPL-ACNC: 9.3 UU/ML (ref 2–23)
LDLC SERPL DIRECT ASSAY-MCNC: 52 MG/DL
LYMPHOCYTES # BLD MANUAL: 1.14 10*3/MM3 (ref 0.7–3.1)
LYMPHOCYTES NFR BLD MANUAL: 7.6 % (ref 5–12)
MAGNESIUM SERPL-SCNC: 1.8 MEQ/L
MCH RBC QN AUTO: 31.1 PG
MCHC RBC AUTO-ENTMCNC: 33.3 G/DL
MCV RBC AUTO: 93.2 FL
MONOCYTES # BLD: 0.38 10*3/MM3 (ref 0.1–0.9)
NEUTROPHILS # BLD AUTO: 3.29 10*3/MM3 (ref 1.7–7)
NEUTROPHILS NFR BLD MANUAL: 65.2 % (ref 42.7–76)
NRBC # BLD AUTO: 0 10*3/MM3 (ref 0–0)
PMV BLD AUTO: 11.3 FL (ref 6–12)
RBC # BLD AUTO: 4.83 10*6/UL
TRIGL SERPL-MCNC: 59 MG/DL (ref 0–150)
TSH SERPL DL<=0.05 MIU/L-ACNC: 3.1 UIU/ML (ref 0.27–4.2)
URATE SERPL-MCNC: 5.3 MG/DL (ref 3.4–7)
VARIANT LYMPHS NFR BLD MANUAL: 22.7 % (ref 19.6–45.3)
VIT B12 BLD-MCNC: 763 PG/ML (ref 211–946)
WBC # BLD: 5 10*3/UL

## 2022-09-19 ENCOUNTER — OFFICE VISIT (OUTPATIENT)
Dept: FAMILY MEDICINE CLINIC | Facility: CLINIC | Age: 62
End: 2022-09-19

## 2022-09-19 VITALS
WEIGHT: 228 LBS | DIASTOLIC BLOOD PRESSURE: 70 MMHG | HEIGHT: 73 IN | BODY MASS INDEX: 30.22 KG/M2 | SYSTOLIC BLOOD PRESSURE: 120 MMHG | HEART RATE: 61 BPM | OXYGEN SATURATION: 98 %

## 2022-09-19 DIAGNOSIS — E79.0 HYPERURICEMIA: ICD-10-CM

## 2022-09-19 DIAGNOSIS — E55.9 VITAMIN D DEFICIENCY: ICD-10-CM

## 2022-09-19 DIAGNOSIS — Z23 FLU VACCINE NEED: ICD-10-CM

## 2022-09-19 DIAGNOSIS — E66.09 CLASS 1 OBESITY DUE TO EXCESS CALORIES WITH SERIOUS COMORBIDITY AND BODY MASS INDEX (BMI) OF 30.0 TO 30.9 IN ADULT: ICD-10-CM

## 2022-09-19 DIAGNOSIS — K50.00 CROHN'S DISEASE OF SMALL INTESTINE WITHOUT COMPLICATION: ICD-10-CM

## 2022-09-19 DIAGNOSIS — I10 BENIGN ESSENTIAL HYPERTENSION: ICD-10-CM

## 2022-09-19 DIAGNOSIS — E11.9 TYPE 2 DIABETES MELLITUS WITHOUT COMPLICATION, WITHOUT LONG-TERM CURRENT USE OF INSULIN: Primary | ICD-10-CM

## 2022-09-19 PROCEDURE — 90471 IMMUNIZATION ADMIN: CPT | Performed by: FAMILY MEDICINE

## 2022-09-19 PROCEDURE — 99214 OFFICE O/P EST MOD 30 MIN: CPT | Performed by: FAMILY MEDICINE

## 2022-09-19 PROCEDURE — 90686 IIV4 VACC NO PRSV 0.5 ML IM: CPT | Performed by: FAMILY MEDICINE

## 2022-09-19 RX ORDER — ATORVASTATIN CALCIUM 20 MG/1
20 TABLET, FILM COATED ORAL DAILY
Qty: 90 TABLET | Refills: 3 | Status: SHIPPED | OUTPATIENT
Start: 2022-09-19

## 2022-09-19 NOTE — PROGRESS NOTES
Subjective   Ramiro Tovar JR is a 62 y.o. male. Presents today for   Chief Complaint   Patient presents with   • Diabetes   • Hyperlipidemia   • Hypertension       History of Present Illness  dm2 normalize dbs;   htn much better;  hld well controlled on statin;    Has crohns and stable, sees Gi  No gout on allopurinol.  Has labs preappt  Losing weight intentionally.   Review of Systems   Respiratory: Negative for shortness of breath.    Cardiovascular: Negative for chest pain and palpitations.   Gastrointestinal: Negative for abdominal pain.       Patient Active Problem List   Diagnosis   • Hyperuricemia   • Benign essential hypertension   • Benign prostatic hypertrophy without urinary obstruction   • Chronic pain syndrome   • Crohn's disease of colon (HCC)   • Dyslipidemia   • Gout   • Weakness of hand   • Knee pain   • Low back pain   • Peripheral neuropathy   • Type 2 diabetes mellitus without complication, without long-term current use of insulin (HCC)   • Vitamin D deficiency   • Crohn's disease (HCC)   • History of colectomy   • Hyperlipidemia   • Calculus of kidney   • Adult body mass index greater than 30   • Small bowel obstruction (HCC)   • Gastroesophageal reflux disease   • Crohn's disease of small intestine with intestinal obstruction (HCC)       Social History     Socioeconomic History   • Marital status:    Tobacco Use   • Smoking status: Never Smoker   • Smokeless tobacco: Never Used   Vaping Use   • Vaping Use: Never used   Substance and Sexual Activity   • Alcohol use: Yes     Comment: RARE   • Drug use: No   • Sexual activity: Defer       Allergies   Allergen Reactions   • Sulfa Antibiotics Rash   • Hydromorphone      Affects his chron's disease doesn't want this drug   • Hydromorphone Hcl      Affects his chron's disease doesn't want this drug. Makes him sick and does not take away pain   • Morphine And Related Nausea And Vomiting     Has problems with his chron's  Has problems with his  chron's. Makes him sick and does not take away pain; makes crohn's worse   • Penicillins Rash     UNKNOWN-CHILDHOOD       Current Outpatient Medications on File Prior to Visit   Medication Sig Dispense Refill   • allopurinol (ZYLOPRIM) 300 MG tablet Take 1 tablet by mouth Daily. 90 tablet 0   • colchicine 0.6 MG tablet Take 1 tablet by mouth Daily. (Patient taking differently: Take 0.6 mg by mouth As Needed.) 30 tablet 6   • dutasteride (AVODART) 0.5 MG capsule Take 1 capsule by mouth Daily. 90 capsule 0   • gabapentin (NEURONTIN) 300 MG capsule Take 300 mg by mouth Daily.     • HYDROcodone-acetaminophen (NORCO)  MG per tablet Take 1 tablet by mouth 3 (Three) Times a Day As Needed. for pain  0   • lisinopril-hydrochlorothiazide (PRINZIDE,ZESTORETIC) 20-25 MG per tablet Take 1 tablet by mouth Daily. 90 tablet 0   • Multiple Vitamin (MULTI VITAMIN DAILY PO) Take  by mouth daily.     • NON FORMULARY Tumeric 1000mg     • Omega-3 Fatty Acids (FISH OIL) 1000 MG capsule capsule Take 1 capsule by mouth.     • ondansetron (ZOFRAN) 4 MG tablet TAKE 1 TABLET BY MOUTH EVERY 6 HOURS AS NEEDED FOR NAUSEA 36 tablet 0   • sildenafil (Viagra) 100 MG tablet Take 1 tablet by mouth Daily As Needed for Erectile Dysfunction. 30 tablet 5   • Stelara 90 MG/ML solution prefilled syringe Injection INJECT 1 SYRINGE UNDER THE SKIN (SUBCUTANEOUS INJECTION) EVERY 8 WEEKS AS DIRECTED 1 each 2   • vitamin B-12 (CYANOCOBALAMIN) 1000 MCG tablet Take  by mouth daily.     • [DISCONTINUED] atorvastatin (LIPITOR) 40 MG tablet Take 1 tablet by mouth Daily. 90 tablet 0   • [DISCONTINUED] metFORMIN ER (GLUCOPHAGE-XR) 500 MG 24 hr tablet Take 2 tablets by mouth Daily With Breakfast. 180 tablet 0   • [DISCONTINUED] lisinopril-hydrochlorothiazide (PRINZIDE,ZESTORETIC) 20-12.5 MG per tablet Take 1 tablet by mouth once daily 90 tablet 0   • [DISCONTINUED] pantoprazole (PROTONIX) 40 MG EC tablet Take 1 tablet by mouth Daily. 90 tablet 3   • [DISCONTINUED]  "ustekinumab (STELARA) 130 MG/26ML solution injection Administer STELARA 130mg IV       No current facility-administered medications on file prior to visit.       Objective   Vitals:    09/19/22 0852 09/19/22 0925   BP: 142/78 120/70   Pulse: 61    SpO2: 98%    Weight: 103 kg (228 lb)    Height: 185.4 cm (73\")      Body mass index is 30.08 kg/m².  BMI above goal, educational material on diet and exercise provided in AVS.  Though greatly improved.    Physical Exam  Vitals and nursing note reviewed.   Constitutional:       Appearance: He is well-developed.   Musculoskeletal:      Cervical back: Neck supple.   Skin:     General: Skin is warm and dry.   Neurological:      Mental Status: He is alert.   Psychiatric:         Behavior: Behavior normal.       Component      Latest Ref Rng & Units 9/6/2022 9/6/2022 9/6/2022           8:48 AM  8:48 AM  8:48 AM   WBC        5.0    RBC Count        4.83    Hemoglobin      13.0 - 17.7 g/dL  15.0    External Hematocrit      %  45    External MCV        93.2    External MCH        31.1    External MCHC        33.3    External RDW        13.2    External Platelets      K/µL  146    MPV      6.0 - 12.0 fL  11.3    Neutrophil Rel %      42.7 - 76.0 %  65.2    Lymphocyte Rel %      19.6 - 45.3 %  22.7    Monocyte Rel %      5.0 - 12.0 %  7.6    Eosinophil Rel %      0.3 - 6.2 %  3.8    Basophil Rel %      0.0 - 1.5 %  0.6    Immature Granulocyte Rel %        0.0 0.0   Absolute nRBC      0.00 - 0.00 10*3/mm3  0.00    Neutrophils Absolute      1.70 - 7.00 10*3/mm3  3.29 (A)    Lymphocytes Absolute      0.70 - 3.10 10*3/mm3  1.14 (A)    Monocytes Absolute      0.10 - 0.90 10*3/mm3  0.38 (A)    Eosinophils Absolute      0.00 - 0.40 10*3/mm3  0.19    Basophils Absolute      0.00 - 0.20 10*3/mm3  0.30 (A)    Total Cholesterol      mg/dL 98     Triglycerides      0 - 150 mg/dL 59     HDL Cholesterol      40 - 60 mg/dL 34 (A)     LDL Cholesterol       mg/dL 52     Microalbumin, Urine      mg/L " 9.7     Creatinine, Urine      mg/dL 66.4     Albumin/Creatinine Ratio, Urine       14.6     Creatine, Serum       0.92     ALT (SGPT)      1 - 41 U/L 29     25 Hydroxy, Vitamin D      30.0 - 100.0 ng/ml 56.3     Hemoglobin A1C      4.8 - 5.9 % 5.2     Vitamin B-12      211 - 946 pg/mL 763     TSH Baseline      0.270 - 4.200 uIU/mL 3.104     Insulin      2 - 23 uU/mL 9.3     Uric Acid      3.4 - 7.0 mg/dL 5.3     Magnesium      mEq/L 1.8       Assessment & Plan   Diagnoses and all orders for this visit:    1. Type 2 diabetes mellitus without complication, without long-term current use of insulin (HCC) (Primary)  -     Comprehensive Metabolic Panel  -     Lipid Panel  -     Hemoglobin A1c  -     atorvastatin (LIPITOR) 20 MG tablet; Take 1 tablet by mouth Daily.  Dispense: 90 tablet; Refill: 3    2. Benign essential hypertension  -     Comprehensive Metabolic Panel    3. Vitamin D deficiency  -     Vitamin D 25 Hydroxy    4. Crohn's disease of small intestine without complication (HCC)  -     Comprehensive Metabolic Panel  -     CBC & Differential    5. Class 1 obesity due to excess calories with serious comorbidity and body mass index (BMI) of 30.0 to 30.9 in adult    6. Hyperuricemia  -     Uric Acid    lost significant weight ok cut lipitor in half and stop metfomrin  Vitamin D looks good  -hypertension - controlled, continue medications  dm2 - check labs prior  See gi for crohsn.   Uric acid ok, would continue allopurinol  Seen in ED for fb, gone now;  Ok use oph bacitracin    Up date flu today       -Follow up: 6 months and prn

## 2022-09-19 NOTE — PATIENT INSTRUCTIONS
"\"8-2-5-Almost None!\"  Healthy Habits Start Early    EAT 5 OR MORE SERVINGS OF VEGETABLES AND FRUITS EVERY DAY.    Help Ramiro get three vegetables and two fruits each day. Red, green, yellow, orange...encourage them to try all the colors so they can enjoy different flavors and get more vitamins.    How can I help Ramiro do this?  ---------------------------------------------  -BE PATIENT WITH Ramiro, remember it may take 10 times before they start to like new food. So, start with small bites and just keep trying.  -Serve at least one vegetable or fruit at every meal. Even try two. Remember, portions do not have to be as big as you think.  -Encourage eating fruits and vegetables instead of drinking them..it's a better way to get fiber and vitamins..so limit the amount of juice to 1/2 cup per day for children 1-6 years and one cup per day for children 7-18 years of age. Try using 1/2 part water and 1/2 part juice.    Spend less than two hours per day watching television and other screen media. Screen media includes video games, movies and computer use for entertainment.    How can I help Ramiro do this?  -Turn off the TV at dinner. Dinner is the best time to hang out with your kids and just talk, learn about their day, and tell them about your day. Your kids have a lot to learn from you and dinner is a great time to share.  "

## 2022-10-15 DIAGNOSIS — I10 BENIGN ESSENTIAL HYPERTENSION: ICD-10-CM

## 2022-10-17 ENCOUNTER — TELEPHONE (OUTPATIENT)
Dept: FAMILY MEDICINE CLINIC | Facility: CLINIC | Age: 62
End: 2022-10-17

## 2022-10-17 RX ORDER — LISINOPRIL AND HYDROCHLOROTHIAZIDE 25; 20 MG/1; MG/1
TABLET ORAL
Qty: 90 TABLET | Refills: 3 | Status: SHIPPED | OUTPATIENT
Start: 2022-10-17

## 2022-10-17 RX ORDER — ALLOPURINOL 300 MG/1
TABLET ORAL
Qty: 90 TABLET | Refills: 3 | Status: SHIPPED | OUTPATIENT
Start: 2022-10-17

## 2022-10-17 RX ORDER — DUTASTERIDE 0.5 MG/1
CAPSULE, LIQUID FILLED ORAL
Qty: 90 CAPSULE | Refills: 3 | Status: SHIPPED | OUTPATIENT
Start: 2022-10-17

## 2022-10-17 NOTE — TELEPHONE ENCOUNTER
Caller: Rosette Tovar    Relationship: Emergency Contact    Best call back number:[phone]    Requested Prescriptions:   Requested Prescriptions     Pending Prescriptions Disp Refills   • lisinopril-hydrochlorothiazide (PRINZIDE,ZESTORETIC) 20-25 MG per tablet [Pharmacy Med Name: Lisinopril-hydroCHLOROthiazide 20-25 MG Oral Tablet] 90 tablet 0     Sig: Take 1 tablet by mouth once daily   • dutasteride (AVODART) 0.5 MG capsule [Pharmacy Med Name: Dutasteride 0.5 MG Oral Capsule] 90 capsule 0     Sig: Take 1 capsule by mouth once daily   • allopurinol (ZYLOPRIM) 300 MG tablet [Pharmacy Med Name: Allopurinol 300 MG Oral Tablet] 90 tablet 0     Sig: Take 1 tablet by mouth once daily        Pharmacy where request should be sent: [facility] [address] [place] [address] [phone] [facility] [phone]      Additional details provided by patient: WIFE STATES THEY ARE LEAVING TOWN TOMORROW AND NEED TO  TODAY IF POSSIBLE.     Does the patient have less than a 3 day supply:  [] Yes  [] No    Saritha Childers Rep   10/17/22 10:36 EDT

## 2023-03-14 ENCOUNTER — TELEPHONE (OUTPATIENT)
Dept: FAMILY MEDICINE CLINIC | Facility: CLINIC | Age: 63
End: 2023-03-14
Payer: COMMERCIAL

## 2023-03-14 NOTE — TELEPHONE ENCOUNTER
Pt's wife states she needs a new PCP referral because she has moved out of the area:    Dr Gigi Masterson  [address]  [facility] [address]  Ph# [phone]    Pt is not scheduled yet.    She has moved and can no longer travel 4 hours to our office.    Pt ph# [phone]

## 2023-03-15 DIAGNOSIS — K50.00 CROHN'S DISEASE OF SMALL INTESTINE WITHOUT COMPLICATION: Primary | ICD-10-CM

## 2023-03-28 RX ORDER — USTEKINUMAB 90 MG/ML
INJECTION, SOLUTION SUBCUTANEOUS
Qty: 1 EACH | Refills: 1 | Status: SHIPPED | OUTPATIENT
Start: 2023-03-28

## 2023-05-08 ENCOUNTER — TELEPHONE (OUTPATIENT)
Dept: GASTROENTEROLOGY | Facility: CLINIC | Age: 63
End: 2023-05-08

## 2023-05-08 DIAGNOSIS — K50.012 CROHN'S DISEASE OF SMALL INTESTINE WITH INTESTINAL OBSTRUCTION: Primary | ICD-10-CM

## 2023-05-08 NOTE — TELEPHONE ENCOUNTER
Caller: Ramiro Tovar JR    Relationship to patient: Self    Best call back number: [phone]    Chief complaint: NEEDS APPT FOR REFILL MEDICATION OF STELARA 90MG.     Type of visit: TELEHEALTH OR VIDEO    Requested date: NEXT AVAIL    Additional notes:PT IS CURRENTLY IN [place] AND DO NOT KNOW WHEN HE WILL BE BACK IN [place]. REQUESTING TELEHEALTH VISIT IN ORDER TO GET REFILL ON MEDICATION.

## 2023-05-10 NOTE — TELEPHONE ENCOUNTER
Call to pt.  Advise per DR Calle's note.      States has dentist appt on 5/15 at same time.  Message to DR Calle for alternative appt time.     Pt states will complete labs at [facility] at [facility].  Lab orders released.   Advise pt contact [facility] to confirm orders can be seen.  If not, will fax orders. Verb understanding.

## 2023-05-10 NOTE — TELEPHONE ENCOUNTER
Telephone visit scheduled for Monday 5/15 at noon - he needs labs asap before I can refill - TB/Hep B surface antigen, cbc, cmp

## 2023-05-17 ENCOUNTER — OFFICE VISIT (OUTPATIENT)
Dept: GASTROENTEROLOGY | Facility: CLINIC | Age: 63
End: 2023-05-17
Payer: COMMERCIAL

## 2023-05-17 VITALS — HEIGHT: 73 IN | BODY MASS INDEX: 30.35 KG/M2 | WEIGHT: 229 LBS

## 2023-05-17 DIAGNOSIS — Z79.899 LONG-TERM CURRENT USE OF USTEKINUMAB: ICD-10-CM

## 2023-05-17 DIAGNOSIS — K50.00 CROHN'S DISEASE OF SMALL INTESTINE WITHOUT COMPLICATION: Primary | ICD-10-CM

## 2023-05-17 DIAGNOSIS — Z86.010 HISTORY OF ADENOMATOUS POLYP OF COLON: ICD-10-CM

## 2023-05-17 PROCEDURE — 99442 PR PHYS/QHP TELEPHONE EVALUATION 11-20 MIN: CPT | Performed by: INTERNAL MEDICINE

## 2023-05-17 NOTE — PROGRESS NOTES
Patient has consented to proceed with a telehealth (telephone) visit in lieu of an office visit.    This encounter was provided via real-time audio/video technology.    Subjective     History of present illness:      63 y.o. male for follow-up of Crohn's disease.    He has a history of Crohn's disease of the small intestine.  He is a previous patient of Dr. Sandro Sotomayor.  He underwent EGD and colonoscopy in December 2019 with findings of LA class a esophagitis and gastritis as well as 2 adenomatous polyps from the colon.  Random biopsies of the colon showed no active inflammation related to Crohn's disease and the visualized terminal ileum was normal.  He was switched from Pentasa to Stelara in February 2020.     He underwent a gastric emptying study which was abnormal in December 2020.  Subsequently underwent an upper GI small bowel follow-through which showed some esophageal dysmotility but no evidence of obstruction.    He reports that symptoms are well controlled -he has had some intermittent bouts of mild symptoms but nothing like he had previously.  He has not had to visit the ER be hospitalized since he was last seen in the office.  He controls his symptoms with his diet.  He has been following a keto diet and has lost almost 70 pounds.  He has not felt this good in years.  Bowel movements are solid no blood in the stool.  No abdominal pain.    Stelara every 8 weeks.  He has felt a little icky after the shot - not consistently - lasts about a day.  No recent fevers, chills, infections.      Heartburn is well controlled.  He takes no medication for this at present.  He now has diet controlled diabetes due to his weight loss.    Past Medical History:  Past Medical History:   Diagnosis Date   • Aspirin long-term use    • Asymptomatic hyperuricemia    • BPH (benign prostatic hyperplasia)    • Chronic pain syndrome    • Crohn's colitis    • Diabetes    • Drug therapy    • Dyslipidemia    • Gout    • Hand  weakness    • Herpes zoster    • Hypertension    • Knee pain    • Low back pain    • Obesity due to excess calories    • Peripheral neuropathy    • Personal history of gout    • Type 2 diabetes mellitus    • Vitamin D deficiency      Past Surgical History:  Past Surgical History:   Procedure Laterality Date   • CATARACT EXTRACTION     • CHOLECYSTECTOMY     • COLONOSCOPY  2014   • COLONOSCOPY N/A 12/10/2019    diverticulosis, ih, polyps: TAs   • ENDOSCOPY N/A 12/10/2019    LA Grade A reflux esophagitis, gastritis, Path benign   • INTESTINAL BYPASS      intestinal stricturoplasty   • UPPER GASTROINTESTINAL ENDOSCOPY  2014      Social History:   Social History     Tobacco Use   • Smoking status: Never   • Smokeless tobacco: Never   Substance Use Topics   • Alcohol use: Yes     Comment: Maybe once a month      Family History:  Family History   Problem Relation Age of Onset   • Heart disease Mother    • Heart attack Mother    • Diabetes Mother    • Gout Father        Home Meds:    Prior to Admission medications    Medication Sig Start Date End Date Taking? Authorizing Provider   allopurinol (ZYLOPRIM) 300 MG tablet Take 1 tablet by mouth once daily 10/17/22  Yes Berlin Mcgarry DO   atorvastatin (LIPITOR) 20 MG tablet Take 1 tablet by mouth Daily. 9/19/22  Yes Berlin Mcgarry DO   colchicine 0.6 MG tablet Take 1 tablet by mouth Daily.  Patient taking differently: Take 1 tablet by mouth As Needed. 11/16/17  Yes Berlin Mcgarry DO   dutasteride (AVODART) 0.5 MG capsule Take 1 capsule by mouth once daily 10/17/22  Yes Berlin Mcgarry DO   HYDROcodone-acetaminophen (NORCO)  MG per tablet Take 1 tablet by mouth 3 (Three) Times a Day As Needed. for pain 4/4/19  Yes Provider, MD Louise   lisinopril-hydrochlorothiazide (PRINZIDE,ZESTORETIC) 20-25 MG per tablet Take 1 tablet by mouth once daily 10/17/22  Yes Berlin Mcgarry DO   Multiple Vitamin (MULTI VITAMIN DAILY PO) Take  by mouth daily. 9/11/14  Yes  Louise Tirado MD   NON FORMULARY Tumeric 1000mg   Yes Louise Tirado MD   Omega-3 Fatty Acids (FISH OIL) 1000 MG capsule capsule Take 1 capsule by mouth. 9/11/14  Yes Louise Tirado MD   ondansetron (ZOFRAN) 4 MG tablet TAKE 1 TABLET BY MOUTH EVERY 6 HOURS AS NEEDED FOR NAUSEA 10/5/20  Yes Berlin Mcgarry DO   sildenafil (Viagra) 100 MG tablet Take 1 tablet by mouth Daily As Needed for Erectile Dysfunction. 11/8/21  Yes Berlin Mcgarry DO   Stelara 90 MG/ML solution prefilled syringe Injection INJECT 1 SYRINGE UNDER THE SKIN (SUBCUTANEOUS INJECTION) EVERY 8 WEEKS AS DIRECTED 3/28/23  Yes Mayra Calle MD   vitamin B-12 (CYANOCOBALAMIN) 1000 MCG tablet Take  by mouth daily. 9/11/14  Yes Louise Tirado MD   gabapentin (NEURONTIN) 300 MG capsule Take 1 capsule by mouth Daily.  5/17/23  Louise Tirado MD     Allergies:  Allergies   Allergen Reactions   • Sulfa Antibiotics Rash   • Hydromorphone      Affects his chron's disease doesn't want this drug   • Hydromorphone Hcl      Affects his chron's disease doesn't want this drug. Makes him sick and does not take away pain   • Morphine And Related Nausea And Vomiting     Has problems with his chron's  Has problems with his chron's. Makes him sick and does not take away pain; makes crohn's worse   • Penicillins Rash     UNKNOWN-CHILDHOOD     Review of Systems  Pertinent items are noted in HPI, all other systems reviewed and negative     Objective         Assessment & Plan   Patient Active Problem List   Diagnosis   • Hyperuricemia   • Benign essential hypertension   • Benign prostatic hypertrophy without urinary obstruction   • Chronic pain syndrome   • Crohn's disease of colon   • Dyslipidemia   • Gout   • Weakness of hand   • Knee pain   • Low back pain   • Peripheral neuropathy   • Type 2 diabetes mellitus without complication, without long-term current use of insulin   • Vitamin D deficiency   • Crohn's disease   • History  of colectomy   • Hyperlipidemia   • Calculus of kidney   • Adult body mass index greater than 30   • Small bowel obstruction   • Gastroesophageal reflux disease   • Crohn's disease of small intestine with intestinal obstruction       Assessment:  1. Crohn's disease of the small intestine  2. Ustekinumab long-term use  3. History of colon polyps    Plan:  · Due for routine labs as well as annual TB/hep B-he will get these this week.  · Crohn's well controlled with Stelara.  No recent issues.  · Continue diet modification as this is resulted in weight loss, improvement in his overall health and better symptom control  · He will be due for repeat colonoscopy in 2024    Time of call was 14 minutes      Mayra Calle MD

## 2023-05-24 ENCOUNTER — TELEPHONE (OUTPATIENT)
Dept: GASTROENTEROLOGY | Facility: CLINIC | Age: 63
End: 2023-05-24
Payer: COMMERCIAL

## 2023-05-24 NOTE — TELEPHONE ENCOUNTER
Hub staff attempted to follow warm transfer process and was unsuccessful     Caller: Ramiro Tovar JR    Relationship to patient: Self    Best call back number: [phone]    Patient is needing: PATIENT STATES THAT HE WENT TO [facility] OUTPATIENT LAB AND THEY WERE NOT ABLE TO PULL ORDERS FROM 5/10/23. PATIENT DOES NOT LIVE NEAR A [facility] AND WILL NEED THESE FAXED -[phone]. PLEASE FAX ORDERS AND/OR CALL PATIENT BACK ASAP.

## 2023-05-31 ENCOUNTER — DOCUMENTATION (OUTPATIENT)
Dept: FAMILY MEDICINE CLINIC | Facility: CLINIC | Age: 63
End: 2023-05-31

## 2023-08-09 NOTE — TELEPHONE ENCOUNTER
Caller: [facility] [place] [address] - [phone] [facility] [phone] FX    Relationship: Pharmacy    Best call back number: [phone]    Requested Prescriptions:   Requested Prescriptions     Pending Prescriptions Disp Refills    Ustekinumab (Stelara) 90 MG/ML solution prefilled syringe Injection 1 each 1        [facility] be sent[facility] [place] [facility] [place] - [phone] [facility] [phone] FX     Last office visit with prescribing clinician: 5/17/2023   Last telemedicine visit with prescribing clinician: Visit date not found   Next office visit with prescribing clinician: 10/11/2023     PHARMACY HAS SENT THREE REQUESTS    Does the patient have less than a 3 day supply:  [x] Yes  [] No    Would you like a call back once the refill request has been completed: [] Yes [x] No    If the office needs to give you a call back, can they leave a voicemail: [x] Yes [] No    Saritha Villatoro Rep   08/09/23 14:59 EDT

## 2023-08-10 ENCOUNTER — TELEPHONE (OUTPATIENT)
Dept: GASTROENTEROLOGY | Facility: CLINIC | Age: 63
End: 2023-08-10
Payer: COMMERCIAL

## 2023-08-10 RX ORDER — USTEKINUMAB 90 MG/ML
INJECTION, SOLUTION SUBCUTANEOUS
Qty: 1 EACH | Refills: 1 | OUTPATIENT
Start: 2023-08-10

## 2023-08-10 NOTE — TELEPHONE ENCOUNTER
Called pt and advised of Dr Calle's note.    Pt verb understanding and would like lab orders faxed to [facility] at [phone] . Pt advised that he can not get there today but will go tomorrow.     Lab orders faxed to the above number and fax confirmation received.  Update sent to Dr Calle.

## 2023-08-28 ENCOUNTER — TELEPHONE (OUTPATIENT)
Dept: GASTROENTEROLOGY | Facility: CLINIC | Age: 63
End: 2023-08-28
Payer: COMMERCIAL

## 2023-08-28 NOTE — TELEPHONE ENCOUNTER
Hub staff attempted to follow warm transfer process and was unsuccessful     Caller: Ramiro Tovar JR    Relationship to patient: Self    Best call back number: [phone]    Patient is needing: TO LET DR DUMONT KNOW THAT HE HAS FOUND A NEW GASTRO DR CLOSER TO HIM.

## (undated) DEVICE — THE SINGLE USE ETRAP – POLYP TRAP IS USED FOR SUCTION RETRIEVAL OF ENDOSCOPICALLY REMOVED POLYPS.: Brand: ETRAP

## (undated) DEVICE — CANN NASL CO2 TRULINK W/O2 A/

## (undated) DEVICE — KT VLV BIOGUARD SXN BIOP AIR/H20 CONN 4PC DISP

## (undated) DEVICE — BITEBLOCK OMNI BLOC

## (undated) DEVICE — THE TORRENT IRRIGATION SCOPE CONNECTOR IS USED WITH THE TORRENT IRRIGATION TUBING TO PROVIDE IRRIGATION FLUIDS SUCH AS STERILE WATER DURING GASTROINTESTINAL ENDOSCOPIC PROCEDURES WHEN USED IN CONJUNCTION WITH AN IRRIGATION PUMP (OR ELECTROSURGICAL UNIT).: Brand: TORRENT

## (undated) DEVICE — TUBING, SUCTION, 1/4" X 10', STRAIGHT: Brand: MEDLINE

## (undated) DEVICE — SNAR POLYP SENSATION STDOVL 27 240 BX40

## (undated) DEVICE — LN SMPL CO2 SHTRM SD STREAM W/M LUER

## (undated) DEVICE — FRCP BX RADJAW4 NDL 2.8 240CM LG OG BX40

## (undated) DEVICE — SENSR O2 OXIMAX FNGR A/ 18IN NONSTR

## (undated) DEVICE — MSK PROC CURAPLEX O2 2/ADAPT 7FT